# Patient Record
Sex: FEMALE | Race: BLACK OR AFRICAN AMERICAN | Employment: FULL TIME | URBAN - METROPOLITAN AREA
[De-identification: names, ages, dates, MRNs, and addresses within clinical notes are randomized per-mention and may not be internally consistent; named-entity substitution may affect disease eponyms.]

---

## 2017-01-24 ENCOUNTER — HOSPITAL ENCOUNTER (EMERGENCY)
Age: 27
Discharge: HOME | End: 2017-01-24
Attending: EMERGENCY MEDICINE
Payer: MEDICAID

## 2017-01-24 VITALS
TEMPERATURE: 98.9 F | HEART RATE: 78 BPM | HEIGHT: 64 IN | OXYGEN SATURATION: 99 % | BODY MASS INDEX: 34.83 KG/M2 | RESPIRATION RATE: 18 BRPM | DIASTOLIC BLOOD PRESSURE: 80 MMHG | WEIGHT: 204 LBS | SYSTOLIC BLOOD PRESSURE: 131 MMHG

## 2017-01-24 DIAGNOSIS — H65.192 OTHER ACUTE NONSUPPURATIVE OTITIS MEDIA OF LEFT EAR: Primary | ICD-10-CM

## 2017-01-24 PROCEDURE — 77030036550 HC SLNG ARM PCH S2SG -A

## 2017-01-24 PROCEDURE — 99283 EMERGENCY DEPT VISIT LOW MDM: CPT

## 2017-01-24 PROCEDURE — 74011250637 HC RX REV CODE- 250/637: Performed by: EMERGENCY MEDICINE

## 2017-01-24 RX ORDER — HYDROCODONE BITARTRATE AND ACETAMINOPHEN 5; 325 MG/1; MG/1
1 TABLET ORAL
Qty: 20 TAB | Refills: 0 | Status: SHIPPED | OUTPATIENT
Start: 2017-01-24 | End: 2017-01-30 | Stop reason: ALTCHOICE

## 2017-01-24 RX ORDER — AZITHROMYCIN 250 MG/1
TABLET, FILM COATED ORAL
Qty: 6 TAB | Refills: 0 | Status: SHIPPED | OUTPATIENT
Start: 2017-01-24 | End: 2017-01-29

## 2017-01-24 RX ORDER — HYDROCODONE BITARTRATE AND ACETAMINOPHEN 5; 325 MG/1; MG/1
1 TABLET ORAL
Status: COMPLETED | OUTPATIENT
Start: 2017-01-24 | End: 2017-01-24

## 2017-01-24 RX ADMIN — HYDROCODONE BITARTRATE AND ACETAMINOPHEN 1 TABLET: 5; 325 TABLET ORAL at 22:06
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
## 2017-01-25 NOTE — ED NOTES
Patient (s) was given copy of dc instructions and one paper script(s) and one electronic scripts. Patient (s) has verbalized understanding of instructions and script (s). Patient given a current medication reconciliation form and verbalized understanding of their medications. Patient (s) has verbalized understanding of the importance of discussing medications with  his or her physician or clinic they will be following up with. Patient alert and oriented and in no acute distress. Patient discharged home ambulatory with family.
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
## 2017-01-30 ENCOUNTER — HOSPITAL ENCOUNTER (EMERGENCY)
Age: 27
Discharge: HOME | End: 2017-01-30
Attending: INTERNAL MEDICINE
Payer: MEDICAID

## 2017-01-30 VITALS
WEIGHT: 205.2 LBS | TEMPERATURE: 98.4 F | HEIGHT: 64 IN | SYSTOLIC BLOOD PRESSURE: 150 MMHG | RESPIRATION RATE: 20 BRPM | HEART RATE: 89 BPM | DIASTOLIC BLOOD PRESSURE: 76 MMHG | OXYGEN SATURATION: 98 % | BODY MASS INDEX: 35.03 KG/M2

## 2017-01-30 DIAGNOSIS — H60.332 ACUTE SWIMMER'S EAR OF LEFT SIDE: Primary | ICD-10-CM

## 2017-01-30 PROCEDURE — 99283 EMERGENCY DEPT VISIT LOW MDM: CPT

## 2017-01-30 RX ORDER — OFLOXACIN 3 MG/ML
5 SOLUTION AURICULAR (OTIC) 2 TIMES DAILY
Qty: 5 ML | Refills: 0 | Status: SHIPPED | OUTPATIENT
Start: 2017-01-30 | End: 2017-02-06

## 2017-01-30 RX ORDER — CLINDAMYCIN HYDROCHLORIDE 150 MG/1
CAPSULE ORAL
Qty: 80 CAP | Refills: 0 | Status: SHIPPED | OUTPATIENT
Start: 2017-01-30 | End: 2017-03-26

## 2017-01-30 RX ORDER — TRAMADOL HYDROCHLORIDE 50 MG/1
TABLET ORAL
Qty: 24 TAB | Refills: 0 | Status: SHIPPED | OUTPATIENT
Start: 2017-01-30 | End: 2017-03-26

## 2017-01-30 RX ORDER — AMOXICILLIN 500 MG/1
500 CAPSULE ORAL 3 TIMES DAILY
COMMUNITY
End: 2017-01-30

## 2017-01-31 NOTE — DISCHARGE INSTRUCTIONS

## 2017-01-31 NOTE — ED PROVIDER NOTES
Patient is a 27 y.o. female presenting with ear pain. The history is provided by the patient. Ear Pain    The current episode started more than 2 days ago. The problem has not changed (Pt currently taking zithromax and cortisporin drops for severe L ear pain not helping) since onset. There has been no fever. Associated symptoms include ear discharge. Pertinent negatives include no vomiting. Past Medical History:   Diagnosis Date    Asthma     Asthma     COPD     Other ill-defined conditions(799.89)      gallstones       Past Surgical History:   Procedure Laterality Date    Delivery       Hx gyn      Hx cholecystectomy  14     lap juan m - Dr. Liana Fernandez Hx  section           History reviewed. No pertinent family history. Social History     Social History    Marital status: SINGLE     Spouse name: N/A    Number of children: N/A    Years of education: N/A     Occupational History    Not on file. Social History Main Topics    Smoking status: Current Every Day Smoker     Packs/day: 0.25     Years: 14.00    Smokeless tobacco: Never Used    Alcohol use No      Comment: Socially    Drug use: No    Sexual activity: Yes     Partners: Male     Birth control/ protection: Condom     Other Topics Concern    Not on file     Social History Narrative         ALLERGIES: Review of patient's allergies indicates no known allergies. Review of Systems   HENT: Positive for ear discharge and ear pain. Cardiovascular: Negative for chest pain. Gastrointestinal: Negative for nausea and vomiting. Vitals:    17 2128   BP: 150/76   Pulse: 89   Resp: 20   Temp: 98.4 °F (36.9 °C)   SpO2: 98%   Weight: 93.1 kg (205 lb 3.2 oz)   Height: 5' 4\" (1.626 m)            Physical Exam   Constitutional: She is oriented to person, place, and time. She appears well-developed and well-nourished. HENT:   Head: Normocephalic and atraumatic.    L canal swollen and very tender on ear movement no mastoid point tenderness   Neck: Normal range of motion. Neck supple. Cardiovascular: Normal rate and regular rhythm. Pulmonary/Chest: Effort normal and breath sounds normal.   Abdominal: Soft. Bowel sounds are normal.   Musculoskeletal: Normal range of motion. Neurological: She is alert and oriented to person, place, and time. Skin: Skin is warm and dry. Psychiatric: She has a normal mood and affect.         MDM  Number of Diagnoses or Management Options     Amount and/or Complexity of Data Reviewed  Review and summarize past medical records: yes      ED Course       Procedures

## 2017-01-31 NOTE — ED NOTES
Seen for complaints of pain L ear that started 1 week ago. States she was seen to day at [facility] AND [facility] for same complaint. States she took over 12 tabs of ibuporfen (800mg tabs) within a 12 hour period. States she told staff at [facility] and that's why she was kept there majority of day. Complains of increased pain post discharge after putting ear drops into L ear. Denies drainage, denies fever. Emergency Department Nursing Plan of Care       The Nursing Plan of Care is developed from the Nursing assessment and Emergency Department Attending provider initial evaluation. The plan of care may be reviewed in the ED Provider note.     The Plan of Care was developed with the following considerations:   Patient / Family readiness to learn indicated by:verbalized understanding  Persons(s) to be included in education: patient  Barriers to Learning/Limitations:No    Signed     Humberto Channel    1/30/2017   9:40 PM

## 2017-03-26 ENCOUNTER — HOSPITAL ENCOUNTER (EMERGENCY)
Age: 27
Discharge: HOME | End: 2017-03-26
Attending: EMERGENCY MEDICINE | Admitting: EMERGENCY MEDICINE
Payer: MEDICAID

## 2017-03-26 VITALS
SYSTOLIC BLOOD PRESSURE: 129 MMHG | OXYGEN SATURATION: 99 % | HEART RATE: 92 BPM | BODY MASS INDEX: 37.73 KG/M2 | DIASTOLIC BLOOD PRESSURE: 99 MMHG | HEIGHT: 62 IN | TEMPERATURE: 98.8 F | RESPIRATION RATE: 20 BRPM | WEIGHT: 205 LBS

## 2017-03-26 DIAGNOSIS — M54.2 NECK PAIN: Primary | ICD-10-CM

## 2017-03-26 PROCEDURE — 74011250637 HC RX REV CODE- 250/637: Performed by: EMERGENCY MEDICINE

## 2017-03-26 PROCEDURE — 99283 EMERGENCY DEPT VISIT LOW MDM: CPT

## 2017-03-26 RX ORDER — OXYCODONE HYDROCHLORIDE 5 MG/1
5 TABLET ORAL
COMMUNITY
End: 2018-04-18

## 2017-03-26 RX ORDER — NAPROXEN 250 MG/1
500 TABLET ORAL
Status: COMPLETED | OUTPATIENT
Start: 2017-03-26 | End: 2017-03-26

## 2017-03-26 RX ORDER — HYDROCODONE BITARTRATE AND ACETAMINOPHEN 10; 325 MG/1; MG/1
1 TABLET ORAL
COMMUNITY
End: 2017-07-03

## 2017-03-26 RX ORDER — HYDROCODONE BITARTRATE AND ACETAMINOPHEN 5; 325 MG/1; MG/1
1 TABLET ORAL
Qty: 7 TAB | Refills: 0 | Status: SHIPPED | OUTPATIENT
Start: 2017-03-26 | End: 2017-07-03

## 2017-03-26 RX ORDER — NAPROXEN 500 MG/1
500 TABLET ORAL
Qty: 20 TAB | Refills: 0 | Status: SHIPPED | OUTPATIENT
Start: 2017-03-26 | End: 2017-07-03

## 2017-03-26 RX ORDER — TRAMADOL HYDROCHLORIDE 50 MG/1
50 TABLET ORAL
Status: COMPLETED | OUTPATIENT
Start: 2017-03-26 | End: 2017-03-26

## 2017-03-26 RX ORDER — IBUPROFEN 800 MG/1
800 TABLET ORAL
COMMUNITY
End: 2019-01-02

## 2017-03-26 RX ADMIN — TRAMADOL HYDROCHLORIDE 50 MG: 50 TABLET, FILM COATED ORAL at 04:37

## 2017-03-26 RX ADMIN — NAPROXEN 500 MG: 250 TABLET ORAL at 04:36

## 2017-03-26 NOTE — ED NOTES
Pt presents ambulatory to ED complaining of chronic pain to the left side of her face. Pt reports she has an appt with a neurologist. Pt reports she is out of pain medications and states \"I couldn't sleep the pain was so bad. \" Pt is alert and oriented x 4, RR even and unlabored, skin is warm and dry. Assesment completed and pt updated on plan of care.

## 2017-03-26 NOTE — ED TRIAGE NOTES
Emergency Department Nursing Plan of Care       The Nursing Plan of Care is developed from the Nursing assessment and Emergency Department Attending provider initial evaluation. The plan of care may be reviewed in the ED Provider note.     The Plan of Care was developed with the following considerations:   Patient / Family readiness to learn indicated by:verbalized understanding  Persons(s) to be included in education: patient  Barriers to Learning/Limitations:No    Signed     Therese Mars RN    3/26/2017   4:19 AM

## 2017-03-26 NOTE — ED NOTES
Pt reports she will see her neurologist on 3/28/17. Discharge instructions were given to the patient by Magda Michel RN. The patient left the Emergency Department ambulatory, alert and oriented and in no acute distress with 2 prescriptions. The patient was encouraged to call or return to the ED for worsening issues or problems and was encouraged to schedule a follow up appointment for continuing care. The patient verbalized understanding of discharge instructions and prescriptions, all questions were answered. The patient has no further concerns at this time.

## 2017-03-26 NOTE — DISCHARGE INSTRUCTIONS
Neck Pain: Care Instructions  Your Care Instructions  You can have neck pain anywhere from the bottom of your head to the top of your shoulders. It can spread to the upper back or arms. Injuries, painting a ceiling, sleeping with your neck twisted, staying in one position for too long, and many other activities can cause neck pain. Most neck pain gets better with home care. Your doctor may recommend medicine to relieve pain or relax your muscles. He or she may suggest exercise and physical therapy to increase flexibility and relieve stress. You may need to wear a special (cervical) collar to support your neck for a day or two. Follow-up care is a key part of your treatment and safety. Be sure to make and go to all appointments, and call your doctor if you are having problems. It's also a good idea to know your test results and keep a list of the medicines you take. How can you care for yourself at home? · Try using a heating pad on a low or medium setting for 15 to 20 minutes every 2 or 3 hours. Try a warm shower in place of one session with the heating pad. · You can also try an ice pack for 10 to 15 minutes every 2 to 3 hours. Put a thin cloth between the ice and your skin. · Take pain medicines exactly as directed. ¨ If the doctor gave you a prescription medicine for pain, take it as prescribed. ¨ If you are not taking a prescription pain medicine, ask your doctor if you can take an over-the-counter medicine. · If your doctor recommends a cervical collar, wear it exactly as directed. When should you call for help? Call your doctor now or seek immediate medical care if:  · You have new or worsening numbness in your arms, buttocks or legs. · You have new or worsening weakness in your arms or legs. (This could make it hard to stand up.)  · You lose control of your bladder or bowels.   Watch closely for changes in your health, and be sure to contact your doctor if:  · Your neck pain is getting worse.  · You are not getting better after 1 week. · You do not get better as expected. Where can you learn more? Go to http://vincent-evelyn.info/. Enter [id number] in the search box to learn more about \"Neck Pain: Care Instructions. \"  Current as of: May 23, 2016  Content Version: 11.1  © 0535-1370 [employer]. Care instructions adapted under license by [employer] (which disclaims liability or warranty for this information). If you have questions about a medical condition or this instruction, always ask your healthcare professional. [place] [address] any warranty or liability for your use of this information.

## 2017-03-26 NOTE — ED PROVIDER NOTES
HPI Comments: Yelitza Garces, 27 y.o. Female with PMHx of asthma and COPD presents ambulatory to [facility] ED with cc of chronic neck, head and upper back pain x 2 months. Pt also reports sinus pressure. Pain is exacerbated with side to side movement. She took Prednisone and ibuprofen at 3 AM without relief. She had an ear infection that spread to her salivary galnds in January that was treated successfully with antibiotics. She later followed up with an ENT specialist and was placed on Prednisone and an antibiotic. The specialist initially thought it was salivary gland stones but did not find any and referred her to a neurologist. She has an appointment with the neurologist in 2 days. She denies chance of pregnancy. She denies a hx of DM, seizures, and HTN. She denies any fevers, chills, nausea, vomiting, diarrhea, CP or SOB. Social history significant for: + Tobacco, + EtOH, - Illicit Drug Use  PSHx: , cholecystectomy    There are no other complaints, changes, or physical findings at this time. Written by ALVIN Castillo, as dictated by Alexus Webster MD.      The history is provided by the patient. No  was used. Past Medical History:   Diagnosis Date    Asthma     Asthma     COPD     Other ill-defined conditions(799.89)     gallstones       Past Surgical History:   Procedure Laterality Date    DELIVERY       HX  SECTION      HX CHOLECYSTECTOMY  14    ananya Constantino HX GYN           History reviewed. No pertinent family history. Social History     Social History    Marital status: SINGLE     Spouse name: N/A    Number of children: N/A    Years of education: N/A     Occupational History    Not on file.      Social History Main Topics    Smoking status: Current Every Day Smoker     Packs/day: 0.25     Years: 14.00    Smokeless tobacco: Never Used    Alcohol use No      Comment: Socially    Drug use: No    Sexual activity: Yes     Partners: Male     Birth control/ protection: Condom     Other Topics Concern    Not on file     Social History Narrative         ALLERGIES: Review of patient's allergies indicates no known allergies. Review of Systems   Constitutional: Negative for appetite change, chills, diaphoresis, fatigue and fever. HENT: Positive for sinus pressure. Negative for sore throat and trouble swallowing. Respiratory: Negative for cough and shortness of breath. Cardiovascular: Negative for chest pain. Gastrointestinal: Negative for abdominal pain, diarrhea, nausea and vomiting. Genitourinary: Negative for difficulty urinating, dysuria and frequency. Musculoskeletal: Positive for back pain and neck pain. Negative for arthralgias and myalgias. Skin: Negative for color change and rash. Neurological: Positive for headaches. Negative for weakness and numbness. Hematological: Does not bruise/bleed easily. All other systems reviewed and are negative. Vitals:    03/26/17 0416   BP: (!) 129/99   Pulse: 92   Resp: 20   Temp: 98.8 °F (37.1 °C)   SpO2: 99%   Weight: 93 kg (205 lb)   Height: 5' 2\" (1.575 m)            Physical Exam   Constitutional: She is oriented to person, place, and time. She appears well-developed and well-nourished. No distress. HENT:   Head: Normocephalic and atraumatic. Mouth/Throat: Oropharynx is clear and moist. No oropharyngeal exudate or posterior oropharyngeal erythema. Neck: Normal range of motion and full passive range of motion without pain. Neck supple. Neck pain produced by extension of neck with force. Pain with movement of head. No focal findings    Cardiovascular: Normal rate, regular rhythm, normal heart sounds, intact distal pulses and normal pulses. Exam reveals no gallop and no friction rub. No murmur heard. Pulmonary/Chest: Effort normal and breath sounds normal. No accessory muscle usage. No respiratory distress.  She has no decreased breath sounds. She has no wheezes. She has no rhonchi. She has no rales. Abdominal: Soft. Bowel sounds are normal. She exhibits no distension. There is no tenderness. There is no rebound, no guarding and no CVA tenderness. Musculoskeletal: Normal range of motion. She exhibits no edema or tenderness. Thoracic back: She exhibits no tenderness and no bony tenderness. Lumbar back: She exhibits no tenderness and no bony tenderness. Lymphadenopathy:     She has no cervical adenopathy. Neurological: She is alert and oriented to person, place, and time. She has normal strength. She is not disoriented. No cranial nerve deficit or sensory deficit. No focal deficits; 5/5 muscle strength in all extremities   Skin: Skin is warm. No lesion and no rash noted. Rash is not nodular. She is not diaphoretic. Nursing note and vitals reviewed. MDM  Number of Diagnoses or Management Options  Neck pain:   Diagnosis management comments: DDx: Chronic neck pain       Amount and/or Complexity of Data Reviewed  Review and summarize past medical records: yes    Patient Progress  Patient progress: stable    ED Course       Procedures    MEDICATIONS GIVEN:  Medications   naproxen (NAPROSYN) tablet 500 mg (not administered)   traMADol (ULTRAM) tablet 50 mg (not administered)       IMPRESSION:  1. Neck pain        PLAN:  1. Current Discharge Medication List      START taking these medications    Details   naproxen (NAPROSYN) 500 mg tablet Take 1 Tab by mouth every twelve (12) hours as needed for Pain. Qty: 20 Tab, Refills: 0      HYDROcodone-acetaminophen (NORCO) 5-325 mg per tablet Take 1 Tab by mouth every four (4) hours as needed for Pain. Max Daily Amount: 6 Tabs. Qty: 7 Tab, Refills: 0         CONTINUE these medications which have NOT CHANGED    Details   HYDROcodone-acetaminophen (NORCO)  mg tablet Take 1 Tab by mouth.            2.   Follow-up Information     Follow up With Details Comments Contact Info Roxana Paul MD   [address]  [phone]      [facility], [address] [facility] [place] [facility] [address]          Return to ED if worse     Discharge Note:  4:33 AM  The pt is ready for discharge. The pt's signs, symptoms, diagnosis, and discharge instructions have been discussed and pt has conveyed their understanding. The pt is to follow up as recommended or return to ER should their symptoms worsen. Plan has been discussed and pt is in agreement. This note is prepared by Briseyda Mcgowan, acting as a Scribe for Javier Jones MD.    Javier Jones MD: The scribe's documentation has been prepared under my direction and personally reviewed by me in its entirety. I confirm that the notes above accurately reflects all work, treatment, procedures, and medical decision making performed by me.

## 2017-07-03 ENCOUNTER — APPOINTMENT (OUTPATIENT)
Dept: GENERAL RADIOLOGY | Age: 27
End: 2017-07-03
Attending: EMERGENCY MEDICINE
Payer: MEDICAID

## 2017-07-03 ENCOUNTER — HOSPITAL ENCOUNTER (EMERGENCY)
Age: 27
Discharge: HOME | End: 2017-07-03
Attending: EMERGENCY MEDICINE
Payer: MEDICAID

## 2017-07-03 VITALS
SYSTOLIC BLOOD PRESSURE: 138 MMHG | HEART RATE: 86 BPM | DIASTOLIC BLOOD PRESSURE: 73 MMHG | OXYGEN SATURATION: 100 % | TEMPERATURE: 98.3 F | RESPIRATION RATE: 17 BRPM | BODY MASS INDEX: 35.58 KG/M2 | WEIGHT: 208.4 LBS | HEIGHT: 64 IN

## 2017-07-03 DIAGNOSIS — R10.84 ABDOMINAL PAIN, GENERALIZED: Primary | ICD-10-CM

## 2017-07-03 DIAGNOSIS — K59.00 CONSTIPATION, UNSPECIFIED CONSTIPATION TYPE: ICD-10-CM

## 2017-07-03 LAB
ALBUMIN SERPL BCP-MCNC: 3.4 G/DL (ref 3.5–5)
ALBUMIN/GLOB SERPL: 1 {RATIO} (ref 1.1–2.2)
ALP SERPL-CCNC: 82 U/L (ref 45–117)
ALT SERPL-CCNC: 33 U/L (ref 12–78)
ANION GAP BLD CALC-SCNC: 10 MMOL/L (ref 5–15)
APPEARANCE UR: CLEAR
AST SERPL W P-5'-P-CCNC: 23 U/L (ref 15–37)
BACTERIA URNS QL MICRO: NEGATIVE /HPF
BASOPHILS # BLD AUTO: 0.2 K/UL
BASOPHILS # BLD: 2 %
BILIRUB SERPL-MCNC: 0.2 MG/DL (ref 0.2–1)
BILIRUB UR QL: NEGATIVE
BUN SERPL-MCNC: 6 MG/DL (ref 6–20)
BUN/CREAT SERPL: 7 (ref 12–20)
CALCIUM SERPL-MCNC: 8.5 MG/DL (ref 8.5–10.1)
CHLORIDE SERPL-SCNC: 104 MMOL/L (ref 97–108)
CO2 SERPL-SCNC: 23 MMOL/L (ref 21–32)
COLOR UR: NORMAL
CREAT SERPL-MCNC: 0.82 MG/DL (ref 0.55–1.02)
DIFFERENTIAL METHOD BLD: ABNORMAL
EOSINOPHIL # BLD: 0 K/UL
EOSINOPHIL NFR BLD: 0 %
EPITH CASTS URNS QL MICRO: NORMAL /LPF
ERYTHROCYTE [DISTWIDTH] IN BLOOD BY AUTOMATED COUNT: 12.9 % (ref 11.5–14.5)
GLOBULIN SER CALC-MCNC: 3.3 G/DL (ref 2–4)
GLUCOSE SERPL-MCNC: 101 MG/DL (ref 65–100)
GLUCOSE UR STRIP.AUTO-MCNC: NEGATIVE MG/DL
HCG UR QL: NEGATIVE
HCT VFR BLD AUTO: 41.2 % (ref 35–47)
HGB BLD-MCNC: 13.8 G/DL (ref 11.5–16)
HGB UR QL STRIP: NEGATIVE
KETONES UR QL STRIP.AUTO: NEGATIVE MG/DL
LEUKOCYTE ESTERASE UR QL STRIP.AUTO: NEGATIVE
LYMPHOCYTES # BLD AUTO: 43 %
LYMPHOCYTES # BLD: 4.8 K/UL
MCH RBC QN AUTO: 29.4 PG (ref 26–34)
MCHC RBC AUTO-ENTMCNC: 33.5 G/DL (ref 30–36.5)
MCV RBC AUTO: 87.8 FL (ref 80–99)
MONOCYTES # BLD: 0.6 K/UL
MONOCYTES NFR BLD AUTO: 5 %
NEUTS SEG # BLD: 5.6 K/UL
NEUTS SEG NFR BLD AUTO: 50 %
NITRITE UR QL STRIP.AUTO: NEGATIVE
PH UR STRIP: 5.5 [PH] (ref 5–8)
PLATELET # BLD AUTO: 322 K/UL (ref 150–400)
POTASSIUM SERPL-SCNC: 3.9 MMOL/L (ref 3.5–5.1)
PROT SERPL-MCNC: 6.7 G/DL (ref 6.4–8.2)
PROT UR STRIP-MCNC: NEGATIVE MG/DL
RBC # BLD AUTO: 4.69 M/UL (ref 3.8–5.2)
RBC #/AREA URNS HPF: NORMAL /HPF (ref 0–5)
RBC MORPH BLD: ABNORMAL
SODIUM SERPL-SCNC: 137 MMOL/L (ref 136–145)
SP GR UR REFRACTOMETRY: 1.01 (ref 1–1.03)
UA: UC IF INDICATED,UAUC: NORMAL
UROBILINOGEN UR QL STRIP.AUTO: 0.2 EU/DL (ref 0.2–1)
WBC # BLD AUTO: 11.2 K/UL (ref 3.6–11)
WBC URNS QL MICRO: NORMAL /HPF (ref 0–4)

## 2017-07-03 PROCEDURE — 74011250637 HC RX REV CODE- 250/637: Performed by: EMERGENCY MEDICINE

## 2017-07-03 PROCEDURE — 85025 COMPLETE CBC W/AUTO DIFF WBC: CPT | Performed by: EMERGENCY MEDICINE

## 2017-07-03 PROCEDURE — 99283 EMERGENCY DEPT VISIT LOW MDM: CPT

## 2017-07-03 PROCEDURE — 80053 COMPREHEN METABOLIC PANEL: CPT | Performed by: EMERGENCY MEDICINE

## 2017-07-03 PROCEDURE — 81001 URINALYSIS AUTO W/SCOPE: CPT | Performed by: EMERGENCY MEDICINE

## 2017-07-03 PROCEDURE — 74020 XR ABD FLAT/ ERECT: CPT

## 2017-07-03 PROCEDURE — 81025 URINE PREGNANCY TEST: CPT

## 2017-07-03 RX ORDER — DICYCLOMINE HYDROCHLORIDE 20 MG/1
20 TABLET ORAL
Status: DISCONTINUED | OUTPATIENT
Start: 2017-07-03 | End: 2017-07-03

## 2017-07-03 RX ORDER — GABAPENTIN 300 MG/1
300 CAPSULE ORAL 3 TIMES DAILY
COMMUNITY
End: 2019-10-30

## 2017-07-03 RX ORDER — DICYCLOMINE HYDROCHLORIDE 20 MG/1
20 TABLET ORAL
Status: DISCONTINUED | OUTPATIENT
Start: 2017-07-03 | End: 2017-07-03 | Stop reason: CLARIF

## 2017-07-03 RX ORDER — DICYCLOMINE HYDROCHLORIDE 20 MG/1
20 TABLET ORAL
Qty: 20 TAB | Refills: 0 | Status: SHIPPED | OUTPATIENT
Start: 2017-07-03 | End: 2018-04-18

## 2017-07-03 RX ORDER — TRAMADOL HYDROCHLORIDE 50 MG/1
50 TABLET ORAL
COMMUNITY
End: 2018-04-18

## 2017-07-03 RX ORDER — DICYCLOMINE HYDROCHLORIDE 10 MG/ML
20 INJECTION INTRAMUSCULAR
Status: DISCONTINUED | OUTPATIENT
Start: 2017-07-03 | End: 2017-07-03

## 2017-07-03 RX ORDER — DOCUSATE SODIUM 100 MG/1
100 CAPSULE, LIQUID FILLED ORAL 2 TIMES DAILY
Qty: 60 CAP | Refills: 2 | Status: SHIPPED | OUTPATIENT
Start: 2017-07-03 | End: 2017-10-01

## 2017-07-03 RX ORDER — DICYCLOMINE HYDROCHLORIDE 10 MG/1
20 CAPSULE ORAL
Status: COMPLETED | OUTPATIENT
Start: 2017-07-03 | End: 2017-07-03

## 2017-07-03 RX ORDER — MAGNESIUM CITRATE
SOLUTION, ORAL ORAL
Qty: 1 BOTTLE | Refills: 0 | Status: SHIPPED | OUTPATIENT
Start: 2017-07-03 | End: 2019-01-02

## 2017-07-03 RX ADMIN — DICYCLOMINE HYDROCHLORIDE 20 MG: 10 CAPSULE ORAL at 09:34

## 2017-07-03 NOTE — ED NOTES
Patient (s)  given copy of dc instructions and 0 paper script(s) and 3 electronic scripts. Patient (s)  verbalized understanding of instructions and script (s). Patient given a current medication reconciliation form and verbalized understanding of their medications. Patient (s) verbalized understanding of the importance of discussing medications with his or her physician or clinic they will be following up with. Patient alert and oriented and in no acute distress. Patient offered wheelchair from treatment area to hospital entrance, patient denied wheelchair.

## 2017-07-03 NOTE — DISCHARGE INSTRUCTIONS
Abdominal Pain: Care Instructions  Your Care Instructions    Abdominal pain has many possible causes. Some aren't serious and get better on their own in a few days. Others need more testing and treatment. If your pain continues or gets worse, you need to be rechecked and may need more tests to find out what is wrong. You may need surgery to correct the problem. Don't ignore new symptoms, such as fever, nausea and vomiting, urination problems, pain that gets worse, and dizziness. These may be signs of a more serious problem. Your doctor may have recommended a follow-up visit in the next 8 to 12 hours. If you are not getting better, you may need more tests or treatment. The doctor has checked you carefully, but problems can develop later. If you notice any problems or new symptoms, get medical treatment right away. Follow-up care is a key part of your treatment and safety. Be sure to make and go to all appointments, and call your doctor if you are having problems. It's also a good idea to know your test results and keep a list of the medicines you take. How can you care for yourself at home? · Rest until you feel better. · To prevent dehydration, drink plenty of fluids, enough so that your urine is light yellow or clear like water. Choose water and other caffeine-free clear liquids until you feel better. If you have kidney, heart, or liver disease and have to limit fluids, talk with your doctor before you increase the amount of fluids you drink. · If your stomach is upset, eat mild foods, such as rice, dry toast or crackers, bananas, and applesauce. Try eating several small meals instead of two or three large ones. · Wait until 48 hours after all symptoms have gone away before you have spicy foods, alcohol, and drinks that contain caffeine. · Do not eat foods that are high in fat. · Avoid anti-inflammatory medicines such as aspirin, ibuprofen (Advil, Motrin), and naproxen (Aleve).  These can cause stomach upset. Talk to your doctor if you take daily aspirin for another health problem. When should you call for help? Call 911 anytime you think you may need emergency care. For example, call if:  · You passed out (lost consciousness). · You pass maroon or very bloody stools. · You vomit blood or what looks like coffee grounds. · You have new, severe belly pain. Call your doctor now or seek immediate medical care if:  · Your pain gets worse, especially if it becomes focused in one area of your belly. · You have a new or higher fever. · Your stools are black and look like tar, or they have streaks of blood. · You have unexpected vaginal bleeding. · You have symptoms of a urinary tract infection. These may include:  ¨ Pain when you urinate. ¨ Urinating more often than usual.  ¨ Blood in your urine. · You are dizzy or lightheaded, or you feel like you may faint. Watch closely for changes in your health, and be sure to contact your doctor if:  · You are not getting better after 1 day (24 hours). Where can you learn more? Go to http://vincentChipInevelyn.info/. Enter C667 in the search box to learn more about \"Abdominal Pain: Care Instructions. \"  Current as of: March 20, 2017  Content Version: 11.3  © 8850-9234 [employer]. Care instructions adapted under license by [employer] (which disclaims liability or warranty for this information). If you have questions about a medical condition or this instruction, always ask your healthcare professional. [place] [address] any warranty or liability for your use of this information. Constipation: Care Instructions  Your Care Instructions  Constipation means that you have a hard time passing stools (bowel movements). People pass stools from 3 times a day to once every 3 days. What is normal for you may be different. Constipation may occur with pain in the rectum and cramping.  The pain may get worse when you try to pass stools. Sometimes there are small amounts of bright red blood on toilet paper or the surface of stools. This is because of enlarged veins near the rectum (hemorrhoids). A few changes in your diet and lifestyle may help you avoid ongoing constipation. Your doctor may also prescribe medicine to help loosen your stool. Some medicines can cause constipation. These include pain medicines and antidepressants. Tell your doctor about all the medicines you take. Your doctor may want to make a medicine change to ease your symptoms. Follow-up care is a key part of your treatment and safety. Be sure to make and go to all appointments, and call your doctor if you are having problems. It's also a good idea to know your test results and keep a list of the medicines you take. How can you care for yourself at home? · Drink plenty of fluids, enough so that your urine is light yellow or clear like water. If you have kidney, heart, or liver disease and have to limit fluids, talk with your doctor before you increase the amount of fluids you drink. · Include high-fiber foods in your diet each day. These include fruits, vegetables, beans, and whole grains. · Get at least 30 minutes of exercise on most days of the week. Walking is a good choice. You also may want to do other activities, such as running, swimming, cycling, or playing tennis or team sports. · Take a fiber supplement, such as Citrucel or Metamucil, every day. Read and follow all instructions on the label. · Schedule time each day for a bowel movement. A daily routine may help. Take your time having your bowel movement. · Support your feet with a small step stool when you sit on the toilet. This helps flex your hips and places your pelvis in a squatting position. · Your doctor may recommend an over-the-counter laxative to relieve your constipation. Examples are Milk of Magnesia and MiraLax. Read and follow all instructions on the label.  Do not use laxatives on a long-term basis. When should you call for help? Call your doctor now or seek immediate medical care if:  · You have new or worse belly pain. · You have new or worse nausea or vomiting. · You have blood in your stools. Watch closely for changes in your health, and be sure to contact your doctor if:  · Your constipation is getting worse. · You do not get better as expected. Where can you learn more? Go to http://vincent-evelyn.info/. Enter 21 [phone] in the search box to learn more about \"Constipation: Care Instructions. \"  Current as of: March 20, 2017  Content Version: 11.3  © 6028-9063 [employer]. Care instructions adapted under license by [employer] (which disclaims liability or warranty for this information). If you have questions about a medical condition or this instruction, always ask your healthcare professional. [address] any warranty or liability for your use of this information.

## 2017-07-03 NOTE — ED NOTES
Pt reports bloating and abdominal discomfort, intermittent since cholecystectomy. Pt reports she missed her appt with GI doc. Pt reports feeling weak yesterday and having diarrhea. Pt reports she eats once a day and didn't eat all day yesterday, woke up in the middle of the night and ate spaghetti. Pt advised to try eating small meals throughout day and advised to keep a food diary and keep track of symptoms.

## 2017-07-03 NOTE — LETTER
[facility] EMERGENCY DEPT 
[address] [facility] [address] [phone] Work/School Note Date: 7/3/2017 To Whom It May concern: 
 
Kathleen Ty was seen and treated today in the emergency room by the following provider(s): 
Attending Provider: Gregg Acuna MD.   
 
Kathleen Ty may return to work on 07/05/0217.  
 
Sincerely, 
 
 
 
 
Gregg Acuna MD

## 2017-07-03 NOTE — ED PROVIDER NOTES
HPI Comments: Tarun Vera is a 32 y.o. female presenting ambulatory to [facility] c/o right sided abdominal pain since yesterday. She reports additional symptoms of nausea, diarrhea, and generalized weakness. Pt describes her pain as \"sharp\", and reports that she gets the pain ~1 time and hour, and it lasts for seconds at a time. She reports that with her bouts of abdominal pain she will get diarrhea, and after she has the diarrhea she feels generalized weakness. Pt notes that she had diarrhea x5 yesterday. She reports that she has had similar symptoms years ago. Pt notes that she has had a cholecystectomy, and had a GI appointment recently, but missed the appointment. She reports that she was a heavy alcoholic in the past. Pt denies vomiting. PCP: Corrie Mathur MD    There are no other complaints, changes, or physical findings at this time. The history is provided by the patient. No  was used. Past Medical History:   Diagnosis Date    Asthma     Asthma     COPD     Other ill-defined conditions     gallstones       Past Surgical History:   Procedure Laterality Date    DELIVERY       HX  SECTION      HX CHOLECYSTECTOMY  14    lap juan m - Dr. Chaim Dixon HX GYN           History reviewed. No pertinent family history. Social History     Social History    Marital status: SINGLE     Spouse name: N/A    Number of children: N/A    Years of education: N/A     Occupational History    Not on file. Social History Main Topics    Smoking status: Current Every Day Smoker     Packs/day: 0.25     Years: 14.00    Smokeless tobacco: Never Used    Alcohol use No      Comment: Socially    Drug use: No    Sexual activity: Yes     Partners: Female     Birth control/ protection: Condom     Other Topics Concern    Not on file     Social History Narrative         ALLERGIES: Review of patient's allergies indicates no known allergies.     Review of Systems Constitutional: Negative for chills and fever. HENT: Negative for congestion, rhinorrhea, sneezing and sore throat. Eyes: Negative for redness and visual disturbance. Respiratory: Negative for shortness of breath. Cardiovascular: Negative for leg swelling. Gastrointestinal: Positive for abdominal pain, diarrhea and nausea. Negative for vomiting. Genitourinary: Negative for difficulty urinating and frequency. Musculoskeletal: Negative for back pain, myalgias and neck stiffness. Skin: Negative for rash. Neurological: Positive for weakness (generalized). Negative for dizziness, syncope and headaches. Hematological: Negative for adenopathy. All other systems reviewed and are negative. Patient Vitals for the past 12 hrs:   Temp Pulse Resp BP SpO2   07/03/17 0840 98.3 °F (36.8 °C) 86 17 138/73 100 %            Physical Exam   Constitutional: She is oriented to person, place, and time. She appears well-developed and well-nourished. HENT:   Head: Normocephalic and atraumatic. Mouth/Throat: Oropharynx is clear and moist.   Eyes: Conjunctivae and EOM are normal.   Neck: Normal range of motion and full passive range of motion without pain. Neck supple. Cardiovascular: Normal rate, regular rhythm, S1 normal, S2 normal, normal heart sounds, intact distal pulses and normal pulses. No murmur heard. Pulmonary/Chest: Effort normal and breath sounds normal. No respiratory distress. She has no wheezes. Abdominal: Soft. Normal appearance and bowel sounds are normal. She exhibits no distension. There is tenderness. There is no rebound. Mild LUQ tenderness, no RUQ tenderness   Musculoskeletal: Normal range of motion. Neurological: She is alert and oriented to person, place, and time. She has normal strength. Skin: Skin is warm, dry and intact. No rash noted. Psychiatric: She has a normal mood and affect.  Her speech is normal and behavior is normal. Judgment and thought content normal. Nursing note and vitals reviewed. MDM  Number of Diagnoses or Management Options  Diagnosis management comments: DDx: constipation, fecal stasis, pancreatitis        Amount and/or Complexity of Data Reviewed  Clinical lab tests: ordered and reviewed  Tests in the radiology section of CPT®: ordered and reviewed  Review and summarize past medical records: yes    Patient Progress  Patient progress: stable    ED Course       Procedures    10:07 AM  Pt's symptoms are improved. Written by Arun Ryamundo ED Scribe, as dictated by Jd Arevalo MD.    LABORATORY TESTS:  Recent Results (from the past 12 hour(s))   URINALYSIS W/ REFLEX CULTURE    Collection Time: 07/03/17  8:46 AM   Result Value Ref Range    Color YELLOW/STRAW      Appearance CLEAR CLEAR      Specific gravity 1.015 1.003 - 1.030      pH (UA) 5.5 5.0 - 8.0      Protein NEGATIVE  NEG mg/dL    Glucose NEGATIVE  NEG mg/dL    Ketone NEGATIVE  NEG mg/dL    Bilirubin NEGATIVE  NEG      Blood NEGATIVE  NEG      Urobilinogen 0.2 0.2 - 1.0 EU/dL    Nitrites NEGATIVE  NEG      Leukocyte Esterase NEGATIVE  NEG      WBC 0-4 0 - 4 /hpf    RBC 0-5 0 - 5 /hpf    Epithelial cells FEW FEW /lpf    Bacteria NEGATIVE  NEG /hpf    UA:UC IF INDICATED CULTURE NOT INDICATED BY UA RESULT CNI     HCG URINE, QL. - POC    Collection Time: 07/03/17  8:47 AM   Result Value Ref Range    Pregnancy test,urine (POC) NEGATIVE  NEG     CBC WITH AUTOMATED DIFF    Collection Time: 07/03/17  8:56 AM   Result Value Ref Range    WBC 11.2 (H) 3.6 - 11.0 K/uL    RBC 4.69 3.80 - 5.20 M/uL    HGB 13.8 11.5 - 16.0 g/dL    HCT 41.2 35.0 - 47.0 %    MCV 87.8 80.0 - 99.0 FL    MCH 29.4 26.0 - 34.0 PG    MCHC 33.5 30.0 - 36.5 g/dL    RDW 12.9 11.5 - 14.5 %    PLATELET 135 117 - 376 K/uL    NEUTROPHILS 50 %    LYMPHOCYTES 43 %    MONOCYTES 5 %    EOSINOPHILS 0 %    BASOPHILS 2 %    ABS. NEUTROPHILS 5.6 K/UL    ABS. LYMPHOCYTES 4.8 K/UL    ABS. MONOCYTES 0.6 K/UL    ABS.  EOSINOPHILS 0.0 K/UL ABS. BASOPHILS 0.2 K/UL    RBC COMMENTS NORMOCYTIC, NORMOCHROMIC      DF MANUAL     METABOLIC PANEL, COMPREHENSIVE    Collection Time: 07/03/17  8:56 AM   Result Value Ref Range    Sodium 137 136 - 145 mmol/L    Potassium 3.9 3.5 - 5.1 mmol/L    Chloride 104 97 - 108 mmol/L    CO2 23 21 - 32 mmol/L    Anion gap 10 5 - 15 mmol/L    Glucose 101 (H) 65 - 100 mg/dL    BUN 6 6 - 20 MG/DL    Creatinine 0.82 0.55 - 1.02 MG/DL    BUN/Creatinine ratio 7 (L) 12 - 20      GFR est AA >60 >60 ml/min/1.73m2    GFR est non-AA >60 >60 ml/min/1.73m2    Calcium 8.5 8.5 - 10.1 MG/DL    Bilirubin, total 0.2 0.2 - 1.0 MG/DL    ALT (SGPT) 33 12 - 78 U/L    AST (SGOT) 23 15 - 37 U/L    Alk. phosphatase 82 45 - 117 U/L    Protein, total 6.7 6.4 - 8.2 g/dL    Albumin 3.4 (L) 3.5 - 5.0 g/dL    Globulin 3.3 2.0 - 4.0 g/dL    A-G Ratio 1.0 (L) 1.1 - 2.2         IMAGING RESULTS:    EXAM:  XR ABD FLAT/ ERECT     INDICATION:   RUQ pain, diarrhea, fecal stasis?     COMPARISON: Ultrasound 2/19/2014.     FINDINGS: Supine and upright views of the abdomen demonstrate a normal gas  pattern with no significant increased colonic stool burden. There is no free  intraperitoneal air. No soft tissue masses or pathologic calcifications are  seen. The bones and soft tissues are within normal limits. Cholecystectomy  clips are noted.     IMPRESSION  IMPRESSION: No acute findings. MEDICATIONS GIVEN:  Medications   dicyclomine (BENTYL) capsule 20 mg (20 mg Oral Given 7/3/17 0934)       IMPRESSION:  1. Abdominal pain, generalized    2. Constipation, unspecified constipation type        PLAN:  1. Discharge Medication List as of 7/3/2017 10:06 AM      START taking these medications    Details   dicyclomine (BENTYL) 20 mg tablet Take 1 Tab by mouth every six (6) hours as needed (abdominal cramps) for up to 20 doses. , Normal, Disp-20 Tab, R-0      magnesium citrate solution Drink 1/2 of the bottle, wait 1 hour, then drink the 2nd half, Normal, Disp-1 Bottle, R-0      docusate sodium (COLACE) 100 mg capsule Take 1 Cap by mouth two (2) times a day for 90 days. , Normal, Disp-60 Cap, R-2         CONTINUE these medications which have NOT CHANGED    Details   oxyCODONE IR (ROXICODONE) 5 mg immediate release tablet Take 5 mg by mouth every four (4) hours as needed for Pain., Historical Med      ibuprofen (MOTRIN) 800 mg tablet Take 800 mg by mouth., Historical Med      HYDROcodone-acetaminophen (NORCO)  mg tablet Take 1 Tab by mouth., Historical Med      naproxen (NAPROSYN) 500 mg tablet Take 1 Tab by mouth every twelve (12) hours as needed for Pain., Print, Disp-20 Tab, R-0      HYDROcodone-acetaminophen (NORCO) 5-325 mg per tablet Take 1 Tab by mouth every four (4) hours as needed for Pain. Max Daily Amount: 6 Tabs., Print, Disp-7 Tab, R-0           2. Follow-up Information     Follow up With Details Comments Contact Monse Huddleston MD Call in 2 days  [address]  [phone]      the GI doctor you were scheduled to see Call      [facility] EMERGENCY DEPT  As needed, If symptoms worsen [address] [phone]        Return to ED if worse     DISCHARGE NOTE:  10:08 AM  The patient is ready for discharge. The patient's signs, symptoms, diagnosis, and discharge instructions have been discussed and the patient has conveyed their understanding. The patient is to follow up as recommended or return to the ER should their symptoms worsen. Plan has been discussed and the patient is in agreement. This note is prepared by Tato Dick, acting as Scribe for MD Manuel Sandoval MD: The scribe's documentation has been prepared under my direction and personally reviewed by me in its entirety. I confirm that the note above accurately reflects all work, treatment, procedures, and medical decision making performed by me.

## 2017-07-10 ENCOUNTER — HOSPITAL ENCOUNTER (EMERGENCY)
Age: 27
Discharge: HOME | End: 2017-07-10
Attending: EMERGENCY MEDICINE
Payer: MEDICAID

## 2017-07-10 VITALS
WEIGHT: 207 LBS | TEMPERATURE: 98.7 F | OXYGEN SATURATION: 97 % | RESPIRATION RATE: 16 BRPM | BODY MASS INDEX: 35.34 KG/M2 | SYSTOLIC BLOOD PRESSURE: 137 MMHG | DIASTOLIC BLOOD PRESSURE: 90 MMHG | HEART RATE: 108 BPM | HEIGHT: 64 IN

## 2017-07-10 DIAGNOSIS — K04.7 DENTAL INFECTION: Primary | ICD-10-CM

## 2017-07-10 PROCEDURE — 99283 EMERGENCY DEPT VISIT LOW MDM: CPT

## 2017-07-10 PROCEDURE — 74011000250 HC RX REV CODE- 250: Performed by: PHYSICIAN ASSISTANT

## 2017-07-10 PROCEDURE — 74011250637 HC RX REV CODE- 250/637: Performed by: PHYSICIAN ASSISTANT

## 2017-07-10 RX ORDER — CLINDAMYCIN HYDROCHLORIDE 150 MG/1
300 CAPSULE ORAL
Status: COMPLETED | OUTPATIENT
Start: 2017-07-10 | End: 2017-07-10

## 2017-07-10 RX ORDER — CLINDAMYCIN HYDROCHLORIDE 300 MG/1
300 CAPSULE ORAL 4 TIMES DAILY
Qty: 28 CAP | Refills: 0 | Status: SHIPPED | OUTPATIENT
Start: 2017-07-10 | End: 2017-07-17

## 2017-07-10 RX ORDER — CHLORHEXIDINE GLUCONATE 1.2 MG/ML
15 RINSE ORAL 2 TIMES DAILY
Qty: 420 ML | Refills: 0 | Status: SHIPPED | OUTPATIENT
Start: 2017-07-10 | End: 2017-07-24

## 2017-07-10 RX ADMIN — CLINDAMYCIN HYDROCHLORIDE 300 MG: 150 CAPSULE ORAL at 15:19

## 2017-07-10 RX ADMIN — LIDOCAINE HYDROCHLORIDE: 20 SOLUTION ORAL; TOPICAL at 15:19

## 2017-07-10 NOTE — ED TRIAGE NOTES
Pt reports having 2 broken teeth to the right back upper jaw for the past several months. Pt states she took Motrin 800 mg with Neurontin 300 mg this morning. Pt got some relief. Pt took Tylenol 1000 mg @ 1330 ans states she did not get any relief.

## 2017-07-10 NOTE — DISCHARGE INSTRUCTIONS
We hope that we have addressed all of your medical concerns. The examination and treatment you received in the Emergency Department were for an emergent problem and were not intended as complete care. It is important that you follow up with your healthcare provider(s) for ongoing care. If your symptoms worsen or do not improve as expected, and you are unable to reach your usual health care provider(s), you should return to the Emergency Department. Today's healthcare is undergoing tremendous change, and patient satisfaction surveys are one of the many tools to assess the quality of medical care. You may receive a survey from the [facility] regarding your experience in the Emergency Department. I hope that your experience has been completely positive, particularly the medical care that I provided. As such, please participate in the survey; anything less than excellent does not meet my expectations or intentions. [address] [facility] and [address] [place] [address] participate in nationally recognized quality of care measures. If your blood pressure is greater than 120/80, as reported below, we urge that you seek medical care to address the potential of high blood pressure, commonly known as hypertension. Hypertension can be hereditary or can be caused by certain medical conditions, pain, stress, or \"white coat syndrome. \"       Please make an appointment with your health care provider(s) for follow up of your Emergency Department visit. VITALS:   Patient Vitals for the past 8 hrs:   Temp Pulse Resp BP SpO2   07/10/17 1443 98.7 °F (37.1 °C) (!) 108 16 137/90 97 %          Thank you for allowing us to provide you with medical care today. We realize that you have many choices for your emergency care needs. Please choose us in the future for any continued health care needs. Omid Rosenberg, [address] [place] [address] [address] Office: [phone]            No results found for this or any previous visit (from the past 24 hour(s)). No results found. Abscessed Tooth: Care Instructions  Your Care Instructions    An abscessed tooth is a tooth that has a pocket of pus in the tissues around it. Pus forms when the body tries to fight an infection caused by bacteria. If the pus cannot drain, it forms an abscess. An abscessed tooth can cause red, swollen gums and throbbing pain, especially when you chew. You may have a bad taste in your mouth and a fever, and your jaw may swell. Damage to the tooth, untreated tooth decay, or gum disease can cause an abscessed tooth. An abscessed tooth needs to be treated by a dental professional right away. If it is not treated, the infection could spread to other parts of your body. Your dentist will give you antibiotics to stop the infection. He or she may make a hole in the tooth or cut open (roman) the abscess inside your mouth so that the infection can drain, which should relieve your pain. You may need to have a root canal treatment, which tries to save your tooth by taking out the infected pulp and replacing it with a healing medicine and/or a filling. If these treatments do not work, your tooth may have to be removed. Follow-up care is a key part of your treatment and safety. Be sure to make and go to all appointments, and call your doctor if you are having problems. It's also a good idea to know your test results and keep a list of the medicines you take. How can you care for yourself at home? · Reduce pain and swelling in your face and jaw by putting ice or a cold pack on the outside of your cheek for 10 to 20 minutes at a time. Put a thin cloth between the ice and your skin. · Take pain medicines exactly as directed. ¨ If the doctor gave you a prescription medicine for pain, take it as prescribed.   ¨ If you are not taking a prescription pain medicine, ask your doctor if you can take an over-the-counter medicine. · Take your antibiotics as directed. Do not stop taking them just because you feel better. You need to take the full course of antibiotics. To prevent tooth abscess  · Brush and floss every day, and have regular dental checkups. · Eat a healthy diet, and avoid sugary foods and drinks. · Do not smoke, use e-cigarettes with nicotine, or use spit tobacco. Tobacco and nicotine slow your ability to heal. Tobacco also increases your risk for gum disease and cancer of the mouth and throat. If you need help quitting, talk to your doctor about stop-smoking programs and medicines. These can increase your chances of quitting for good. When should you call for help? Call 911 anytime you think you may need emergency care. For example, call if:  · You have trouble breathing. Call your doctor now or seek immediate medical care if:  · You have new or worse symptoms of infection, such as:  ¨ Increased pain, swelling, warmth, or redness. ¨ Red streaks leading from the area. ¨ Pus draining from the area. ¨ A fever. Watch closely for changes in your health, and be sure to contact your doctor if:  · You do not get better as expected. Where can you learn more? Go to http://vincent-evelyn.info/. Enter J928 in the search box to learn more about \"Abscessed Tooth: Care Instructions. \"  Current as of: September 19, 2016  Content Version: 11.3  © 3566-2348 [employer]. Care instructions adapted under license by [employer] (which disclaims liability or warranty for this information). If you have questions about a medical condition or this instruction, always ask your healthcare professional. [place] [address] any warranty or liability for your use of this information.

## 2017-07-10 NOTE — ED PROVIDER NOTES
HPI Comments: 32 y.o. female with past medical history significant for gallstones, asthma, and COPD who presents from home with chief complaint of dental pain. Patient mentions hx of dental issues for the past 10 years. Patient states onset of moderate right upper jaw pain over the right cheek since last night, throbbing and aching, 7/10 in severity. Patient admits she is unsure which tooth is causing her sx at this time, but mentions she has a dental appointment in 8 days. Patient notes she has tried a variety of medications for her sx with little relief including gargling water and salt. Patient notes she took Ibuprofen 800 mg with Gabapentin 300 mg earlier this morning and another dose of these medications 1.5 hours ago. Patient also took Tylenol 1000 mg 1 hour ago with no relief. Patient denies sinus issues, runny nose, difficulty swallowing, fever, chills, CP, SOB, and congestion. There are no other acute medical concerns at this time. Social hx: Tobacco Use: Yes (1/4 pack per day), Alcohol Use: No    PCP: Mariella Resendez MD    Note written by Bala Live, as dictated by Darryle Miners, PA 2:58 PM      The history is provided by the patient. Past Medical History:   Diagnosis Date    Asthma     Asthma     COPD     Other ill-defined conditions     gallstones       Past Surgical History:   Procedure Laterality Date    DELIVERY       HX  SECTION      HX CHOLECYSTECTOMY  14    [facility] - Dr. Hola Anand HX GYN           History reviewed. No pertinent family history. Social History     Social History    Marital status: SINGLE     Spouse name: N/A    Number of children: N/A    Years of education: N/A     Occupational History    Not on file.      Social History Main Topics    Smoking status: Current Every Day Smoker     Packs/day: 0.25     Years: 14.00    Smokeless tobacco: Never Used    Alcohol use No      Comment: Socially    Drug use: No    Sexual activity: Yes     Partners: Female     Birth control/ protection: Condom     Other Topics Concern    Not on file     Social History Narrative         ALLERGIES: Review of patient's allergies indicates no known allergies. Review of Systems   Constitutional: Negative for fever. HENT: Positive for dental problem and facial swelling. Negative for congestion and trouble swallowing. Eyes: Negative for visual disturbance. Respiratory: Negative for shortness of breath. Cardiovascular: Negative for chest pain. Gastrointestinal: Negative for vomiting. Musculoskeletal: Negative for neck pain and neck stiffness. Neurological: Negative for seizures. Psychiatric/Behavioral: Negative for confusion. All other systems reviewed and are negative. Vitals:    07/10/17 1443   BP: 137/90   Pulse: (!) 108   Resp: 16   Temp: 98.7 °F (37.1 °C)   SpO2: 97%   Weight: 93.9 kg (207 lb)   Height: 5' 4\" (1.626 m)            Physical Exam   Constitutional: She is oriented to person, place, and time. She appears well-developed and well-nourished. No distress. Pleasant AA female   HENT:   Head: Normocephalic and atraumatic. Right Ear: External ear normal.   Left Ear: External ear normal.   Mouth/Throat:       Minimal questionable swelling over the right cheek without erythema or warmth  No trismus, stridor, or drooling  Overall somewhat poor dentition  No sublingual or posterior oropharyngeal edema or erythema   Eyes: Conjunctivae are normal. No scleral icterus. Neck: Normal range of motion. Neck supple. No tracheal deviation present. Cardiovascular: Normal rate, regular rhythm and normal heart sounds. Exam reveals no gallop and no friction rub. No murmur heard. No murmur   Pulmonary/Chest: Effort normal and breath sounds normal. No stridor. No respiratory distress. She has no wheezes. Abdominal: Soft. She exhibits no distension. Musculoskeletal: Normal range of motion.    Lymphadenopathy:     She has no cervical adenopathy. Neurological: She is alert and oriented to person, place, and time. Skin: Skin is warm and dry. Psychiatric: She has a normal mood and affect. Her behavior is normal.   Nursing note and vitals reviewed. MDM  Number of Diagnoses or Management Options  Dental infection:   Diagnosis management comments: 32year old female presenting to the ED for dental pain with associated minimal facial swelling. No F/C or other systemic symptoms. No symptoms or findings on exam c/f deep space infection. Discussed with pt use of abx, peridex, OTC meds PRN pain, pt has dental f/u in 8 days. Return precautions given.        Amount and/or Complexity of Data Reviewed  Discuss the patient with other providers: yes (Dr. Nigel Mcdaniel, ED attending)      ED Course       Procedures

## 2018-04-18 ENCOUNTER — HOSPITAL ENCOUNTER (EMERGENCY)
Age: 28
Discharge: HOME | End: 2018-04-18
Attending: EMERGENCY MEDICINE
Payer: MEDICAID

## 2018-04-18 VITALS
HEIGHT: 64 IN | BODY MASS INDEX: 36.5 KG/M2 | TEMPERATURE: 98.1 F | SYSTOLIC BLOOD PRESSURE: 150 MMHG | OXYGEN SATURATION: 99 % | HEART RATE: 96 BPM | WEIGHT: 213.8 LBS | RESPIRATION RATE: 16 BRPM | DIASTOLIC BLOOD PRESSURE: 101 MMHG

## 2018-04-18 DIAGNOSIS — R03.0 ELEVATED BLOOD PRESSURE READING: ICD-10-CM

## 2018-04-18 DIAGNOSIS — F17.200 TOBACCO DEPENDENCE: ICD-10-CM

## 2018-04-18 DIAGNOSIS — R51.9 LEFT FACIAL PRESSURE AND PAIN: Primary | ICD-10-CM

## 2018-04-18 LAB — HCG UR QL: NEGATIVE

## 2018-04-18 PROCEDURE — 81025 URINE PREGNANCY TEST: CPT

## 2018-04-18 PROCEDURE — 99284 EMERGENCY DEPT VISIT MOD MDM: CPT

## 2018-04-18 PROCEDURE — 74011250637 HC RX REV CODE- 250/637: Performed by: PHYSICIAN ASSISTANT

## 2018-04-18 RX ORDER — CLINDAMYCIN HYDROCHLORIDE 150 MG/1
300 CAPSULE ORAL
Status: COMPLETED | OUTPATIENT
Start: 2018-04-18 | End: 2018-04-18

## 2018-04-18 RX ORDER — PREDNISONE 10 MG/1
TABLET ORAL
Qty: 21 TAB | Refills: 0 | Status: SHIPPED | OUTPATIENT
Start: 2018-04-18 | End: 2019-01-02

## 2018-04-18 RX ORDER — CLINDAMYCIN HYDROCHLORIDE 300 MG/1
300 CAPSULE ORAL 3 TIMES DAILY
Qty: 10 CAP | Refills: 0 | Status: SHIPPED | OUTPATIENT
Start: 2018-04-18 | End: 2018-04-28

## 2018-04-18 RX ORDER — ONDANSETRON 4 MG/1
4 TABLET, ORALLY DISINTEGRATING ORAL
Status: COMPLETED | OUTPATIENT
Start: 2018-04-18 | End: 2018-04-18

## 2018-04-18 RX ORDER — OXYCODONE AND ACETAMINOPHEN 5; 325 MG/1; MG/1
1 TABLET ORAL
Qty: 12 TAB | Refills: 0 | Status: SHIPPED | OUTPATIENT
Start: 2018-04-18 | End: 2018-04-30

## 2018-04-18 RX ORDER — OXYCODONE AND ACETAMINOPHEN 5; 325 MG/1; MG/1
2 TABLET ORAL
Status: COMPLETED | OUTPATIENT
Start: 2018-04-18 | End: 2018-04-18

## 2018-04-18 RX ORDER — OXYCODONE HYDROCHLORIDE 5 MG/1
5 TABLET ORAL
Qty: 12 TAB | Refills: 0 | Status: SHIPPED | OUTPATIENT
Start: 2018-04-18 | End: 2018-04-18 | Stop reason: CLARIF

## 2018-04-18 RX ADMIN — OXYCODONE HYDROCHLORIDE AND ACETAMINOPHEN 2 TABLET: 5; 325 TABLET ORAL at 12:50

## 2018-04-18 RX ADMIN — CLINDAMYCIN HYDROCHLORIDE 300 MG: 150 CAPSULE ORAL at 12:50

## 2018-04-18 RX ADMIN — ONDANSETRON 4 MG: 4 TABLET, ORALLY DISINTEGRATING ORAL at 12:49

## 2018-04-18 NOTE — DISCHARGE INSTRUCTIONS
Head or Face Pain: Care Instructions  Your Care Instructions    Common causes of head or face pain are allergies, stress, and injuries. Other causes include tooth problems and sinus infections. Eating certain foods, such as chocolate or cheese, or drinking certain liquids, such as coffee or cola, can cause head pain for some people. If you have mild head pain, you may not need treatment. It is important to watch your symptoms and talk to your doctor if your pain continues or gets worse. Follow-up care is a key part of your treatment and safety. Be sure to make and go to all appointments, and call your doctor if you are having problems. It's also a good idea to know your test results and keep a list of the medicines you take. How can you care for yourself at home? · Take pain medicines exactly as directed. ¨ If the doctor gave you a prescription medicine for pain, take it as prescribed. ¨ If you are not taking a prescription pain medicine, ask your doctor if you can take an over-the-counter pain medicine. · Take it easy for the next few days or longer if you are not feeling well. · Use a warm, moist towel or heating pad set on low to relax tight muscles in your shoulder and neck. Have someone gently massage your neck and shoulders. · Put ice or a cold pack on the area for 10 to 20 minutes at a time. Put a thin cloth between the ice and your skin. When should you call for help? Call 911 anytime you think you may need emergency care. For example, call if:  ? · You have twitching, jerking, or a seizure. ? · You passed out (lost consciousness). ? · You have symptoms of a stroke. These may include:  ¨ Sudden numbness, tingling, weakness, or loss of movement in your face, arm, or leg, especially on only one side of your body. ¨ Sudden vision changes. ¨ Sudden trouble speaking. ¨ Sudden confusion or trouble understanding simple statements. ¨ Sudden problems with walking or balance.   ¨ A sudden, severe headache that is different from past headaches. ? · You have jaw pain and pain in your chest, shoulder, neck, or arm. ?Call your doctor now or seek immediate medical care if:  ? · You have a fever with a stiff neck or a severe headache. ? · You have nausea and vomiting, or you cannot keep food or liquids down. ? Watch closely for changes in your health, and be sure to contact your doctor if:  ? · Your head or face pain does not get better as expected. Where can you learn more? Go to http://vincent-evelyn.info/. Enter P568 in the search box to learn more about \"Head or Face Pain: Care Instructions. \"  Current as of: March 20, 2017  Content Version: 11.4  © 4490-7975 [employer]. Care instructions adapted under license by [employer] (which disclaims liability or warranty for this information). If you have questions about a medical condition or this instruction, always ask your healthcare professional. [place] [address] any warranty or liability for your use of this information. Elevated Blood Pressure: Care Instructions  Your Care Instructions    Blood pressure is a measure of how hard the blood pushes against the walls of your arteries. It's normal for blood pressure to go up and down throughout the day. But if it stays up over time, you have high blood pressure. Two numbers tell you your blood pressure. The first number is the systolic pressure. It shows how hard the blood pushes when your heart is pumping. The second number is the diastolic pressure. It shows how hard the blood pushes between heartbeats, when your heart is relaxed and filling with blood. An ideal blood pressure in adults is less than 120/80 (say \"120 over 80\"). High blood pressure is 140/90 or higher. You have high blood pressure if your top number is 140 or higher or your bottom number is 90 or higher, or both.   The main test for high blood pressure is simple, fast, and painless. To diagnose high blood pressure, your doctor will test your blood pressure at different times. After testing your blood pressure, your doctor may ask you to test it again when you are home. If you are diagnosed with high blood pressure, you can work with your doctor to make a long-term plan to manage it. Follow-up care is a key part of your treatment and safety. Be sure to make and go to all appointments, and call your doctor if you are having problems. It's also a good idea to know your test results and keep a list of the medicines you take. How can you care for yourself at home? · Do not smoke. Smoking increases your risk for heart attack and stroke. If you need help quitting, talk to your doctor about stop-smoking programs and medicines. These can increase your chances of quitting for good. · Stay at a healthy weight. · Try to limit how much sodium you eat to less than 2,300 milligrams (mg) a day. Your doctor may ask you to try to eat less than 1,500 mg a day. · Be physically active. Get at least 30 minutes of exercise on most days of the week. Walking is a good choice. You also may want to do other activities, such as running, swimming, cycling, or playing tennis or team sports. · Avoid or limit alcohol. Talk to your doctor about whether you can drink any alcohol. · Eat plenty of fruits, vegetables, and low-fat dairy products. Eat less saturated and total fats. · Learn how to check your blood pressure at home. When should you call for help? Call your doctor now or seek immediate medical care if:  ? · Your blood pressure is much higher than normal (such as 180/110 or higher). ? · You think high blood pressure is causing symptoms such as:  ¨ Severe headache. ¨ Blurry vision. ? Watch closely for changes in your health, and be sure to contact your doctor if:  ? · You do not get better as expected. Where can you learn more?   Go to http://vincent-evelyn.info/. Enter I775 in the search box to learn more about \"Elevated Blood Pressure: Care Instructions. \"  Current as of: September 21, 2016  Content Version: 11.4  © 4875-6203 [employer]. Care instructions adapted under license by [employer] (which disclaims liability or warranty for this information). If you have questions about a medical condition or this instruction, always ask your healthcare professional. [place] [address] any warranty or liability for your use of this information. Trigeminal Neuralgia: Care Instructions  Your Care Instructions    Trigeminal neuralgia is a problem with the large nerve that brings feeling to your face. It causes a sudden, sharp pain on one side of your face. Just touching your cheek or talking can set off shooting pain toward the ear, eye, or nostril. Living with this pain can be very hard. Some people have long periods when they do not have pain, and then it comes back. Some people have periods of pain often. But medicine or other treatment often can make the pain go away. If you keep having pain, surgery may help. This problem is also called tic douloureux (say \"tik doo-jessie-QUINTON\"). Follow-up care is a key part of your treatment and safety. Be sure to make and go to all appointments, and call your doctor if you are having problems. It's also a good idea to know your test results and keep a list of the medicines you take. How can you care for yourself at home? · Write down when you have pain and what you were doing when it started. Try to find what causes the pain. Being in a cold wind, yawning, or shaving are examples. Avoid or limit these triggers if you can. · Be safe with medicines. Take your medicines exactly as prescribed. ¨ Your doctor may have prescribed medicines used to treat depression and seizures.  They can reduce your pain, help you sleep better, and improve your mood.  ¨ Call your doctor if you think you are having a problem with your medicine. You will get more details on the specific medicines your doctor prescribes. ¨ If you are not taking a prescription pain medicine, take an over-the-counter medicine such as acetaminophen (Tylenol), ibuprofen (Advil, Motrin), or naproxen (Aleve). Read and follow all instructions on the label. ¨ Do not take two or more pain medicines at the same time unless the doctor told you to. Many pain medicines have acetaminophen, which is Tylenol. Too much acetaminophen (Tylenol) can be harmful. · Reduce stress in your life. Ask your doctor about ways to relax. These may include breathing exercises and massage. · Think about joining a support group with other people who have this problem. These groups can give comfort and information about what to do to feel better. Your doctor can tell you how to find a support group. When should you call for help? Call your doctor now or seek immediate medical care if:  ? · You have severe pain that you can't control. ? Watch closely for changes in your health, and be sure to contact your doctor if:  ? · You are not able to sleep because of the pain. ? · You do not get better as expected. Where can you learn more? Go to http://vincent-evelyn.info/. Enter R378 in the search box to learn more about \"Trigeminal Neuralgia: Care Instructions. \"  Current as of: March 20, 2017  Content Version: 11.4  © 6478-1420 [employer]. Care instructions adapted under license by [employer] (which disclaims liability or warranty for this information). If you have questions about a medical condition or this instruction, always ask your healthcare professional. [address] any warranty or liability for your use of this information.

## 2018-04-18 NOTE — Clinical Note
Follow up with primary care for recheck of blood pressure. Follow up with your Ear, Nose, Throat specialist for recheck. Contact information provided for local ENT if needed. Also follow up with your Neurologist at [facility] for recheck. Return to the [address] [place] [address] Dept for any concerns.

## 2018-04-18 NOTE — LETTER
[facility] EMERGENCY DEPT 
[address] [facility] [address] [address] 
[phone] Work/School Note Date: 4/18/2018 To Whom It May concern: 
 
Husam Rapp was seen and treated today in the emergency room. She may return to work in 2 to 3 days, as symptoms improve. Sincerely, Pau Bishop

## 2018-04-18 NOTE — ED NOTES
Pt presents to the ED with c/o left ear pain that radiates to her jaw and neck X1 day. Pt reports taking ibuprofen but this caused her face to feel like it was burning. Pt was given amoxicillin and started taking that last night. Pt is speaking in full sentences. Pt is ambulatory. Pt is alert and oriented. Emergency Department Nursing Plan of Care       The Nursing Plan of Care is developed from the Nursing assessment and Emergency Department Attending provider initial evaluation. The plan of care may be reviewed in the ED Provider note.     The Plan of Care was developed with the following considerations:   Patient / Family readiness to learn indicated by:verbalized understanding  Persons(s) to be included in education: patient  Barriers to Learning/Limitations:No    Signed     Eleazar Oakley    4/18/2018   11:54 AM

## 2018-04-18 NOTE — ED NOTES
Patient was discharged by provider. .    Patient has been instructed that they have been given Percocet* which contains opioids, benzodiazepines, or other sedating drugs. Patient is aware that they  will need to refrain from driving or operating heavy machinery after taking this medication. Patient also instructed that they need to avoid drinking alcohol and using other products containing opioids, benzodiazepines, or other sedating drugs. Patient verbalized understanding.

## 2018-04-18 NOTE — ED PROVIDER NOTES
EMERGENCY DEPARTMENT HISTORY AND PHYSICAL EXAM      Date: 4/18/2018  Patient Name: Keila Horner    History of Presenting Illness     Chief Complaint   Patient presents with    Dental Pain     left side since yesterday, [facility] ibuprofen 800mg and amoxicillin started, worsening pain after meds    Ear Pain     left side       History Provided By: Patient    HPI: Keila Horner, 32 y.o. female with PMHx significant for Asthma, COPD and tobacco dependence presents ambulatory to the ED with cc of L dental and ear pain. Pt states she was out of town when she had abrupt onset L sided facial pain to maxillary region/L ear. Pt denied trauma/injury. \"I assumed it was because of my teeth, because I have some bad teeth on that side\". Pt states she began taking some leftover Amoxicillin but the pain intensified. She went to [facility] this am where she was evaluated and placed on pseudophedrine for \"fluid in my ear\". Pt states the pain has worsened since onset. The 800mg Ibuprofen she has been taking is no longer working. Pt denied fever/chills. No drainage or bleeding from teeth/gums. Pt states she had similar sx \"except it was in the back of my head\" in January of this year and was referred to an Ear, Nose, Throat specialist who later referred her to a Neurologist at [facility]. Pt states she was diagnosed with \"occiput something\" and given Neurontin which she did not tolerate well. Pt denied rash. No difficulty chewing. C/o feeling of tenderness over sinuses as well as sensation of swelling to the L face. PCP: Elis Rangel MD    There are no other complaints, changes, or physical findings at this time. Current Outpatient Prescriptions   Medication Sig Dispense Refill    dicyclomine (BENTYL) 20 mg tablet Take 1 Tab by mouth every six (6) hours as needed (abdominal cramps) for up to 20 doses.  20 Tab 0    magnesium citrate solution Drink 1/2 of the bottle, wait 1 hour, then drink the 2nd half 1 Bottle 0    gabapentin (NEURONTIN) 300 mg capsule Take 300 mg by mouth three (3) times daily.  traMADol (ULTRAM) 50 mg tablet Take 50 mg by mouth every six (6) hours as needed for Pain.  ibuprofen (MOTRIN) 800 mg tablet Take 800 mg by mouth.  oxyCODONE IR (ROXICODONE) 5 mg immediate release tablet Take 5 mg by mouth every four (4) hours as needed for Pain. Past History     Past Medical History:  Past Medical History:   Diagnosis Date    Asthma     Asthma     COPD     Other ill-defined conditions     gallstones       Past Surgical History:  Past Surgical History:   Procedure Laterality Date    DELIVERY       HX  SECTION      HX CHOLECYSTECTOMY  14    ananya Batista [facility] GYN         Family History:  No family history on file. Social History:  Social History   Substance Use Topics    Smoking status: Current Every Day Smoker     Packs/day: 0.25     Years: 14.00    Smokeless tobacco: Never Used    Alcohol use No      Comment: Socially       Allergies:  No Known Allergies      Review of Systems   Review of Systems   Constitutional: Negative for chills and fever. HENT: Positive for dental problem, ear pain and sinus pressure. Negative for congestion, facial swelling, rhinorrhea, sore throat, tinnitus, trouble swallowing and voice change. Eyes: Negative for pain, discharge, redness and itching. Respiratory: Negative for cough and shortness of breath. Cardiovascular: Negative for chest pain and palpitations. Gastrointestinal: Negative for abdominal pain, diarrhea, nausea and vomiting. Endocrine: Negative for polydipsia, polyphagia and polyuria. Genitourinary: Negative for dysuria and hematuria. Musculoskeletal: Negative for neck pain and neck stiffness. Skin: Negative for rash and wound. Allergic/Immunologic: Negative for food allergies and immunocompromised state. Neurological: Negative for dizziness and headaches. Psychiatric/Behavioral: Negative for agitation and confusion. The patient is nervous/anxious. Physical Exam   Physical Exam   Constitutional: She is oriented to person, place, and time. She appears well-developed and well-nourished. WDWN AA female, alert, in marked discomfort, moaning during exam   HENT:   Head: Normocephalic and atraumatic. Right Ear: External ear normal.   Left Ear: External ear normal.   Nose: Nose normal.   Mouth/Throat: No oropharyngeal exudate. Pt with tenderness noted to L frontal region and L maxillary region, no erythema/heat or appreciable facial swelling. Poor overall dental health with multiple dental caries and missing teeth but no appreciable abscess. No dental tenderness. Increased effusion noted to TMs bilat. No erythema. Good light reflex noted. Eyes: Conjunctivae and EOM are normal. Pupils are equal, round, and reactive to light. Right eye exhibits no discharge. Left eye exhibits no discharge. No scleral icterus. Neck: Normal range of motion. Neck supple. No JVD present. No tracheal deviation present. No thyromegaly present. Cardiovascular: Normal rate, regular rhythm and normal heart sounds. Pulmonary/Chest: Effort normal and breath sounds normal. No respiratory distress. She has no wheezes. Abdominal: Soft. There is no tenderness. Musculoskeletal: Normal range of motion. She exhibits no edema. Lymphadenopathy:     She has no cervical adenopathy. Neurological: She is alert and oriented to person, place, and time. She exhibits normal muscle tone. Coordination normal.   Skin: Skin is warm and dry. No rash noted. She is not diaphoretic. No erythema. Psychiatric: She has a normal mood and affect. Her behavior is normal. Judgment normal.   Nursing note and vitals reviewed. Diagnostic Study Results         Medical Decision Making   I am the first provider for this patient.     I reviewed the vital signs, available nursing notes, past medical history, past surgical history, family history and social history. Vital Signs-Reviewed the patient's vital signs. Patient Vitals for the past 12 hrs:   Temp Pulse Resp SpO2   04/18/18 1130 98.1 °F (36.7 °C) 93 16 99 %         Records Reviewed: Old Medical Records    Provider Notes (Medical Decision Making):   Sinusitis, dentalgia, dental caries/abcess, otitis, temporal neuralgia    ED Course:   Initial assessment performed. The patients presenting problems have been discussed, and they are in agreement with the care plan formulated and outlined with them. I have encouraged them to ask questions as they arise throughout their visit. Will d/c home with antibiotics and steroids and have encouraged follow up with her ENT and her Neurologist, as I suspect this is related to her prior symptoms (Neuralgia). PLAN:  1. Current Discharge Medication List      START taking these medications    Details   clindamycin (CLEOCIN) 300 mg capsule Take 1 Cap by mouth three (3) times daily for 10 days. Qty: 10 Cap, Refills: 0      predniSONE (STERAPRED DS) 10 mg dose pack Take as directed  Qty: 21 Tab, Refills: 0      oxyCODONE IR (ROXICODONE) 5 mg immediate release tablet Take 1 Tab by mouth every six (6) hours as needed for Pain for up to 12 doses. Max Daily Amount: 20 mg.  Qty: 12 Tab, Refills: 0    Associated Diagnoses: Left facial pressure and pain           2. Follow-up Information     Follow up With Details Comments [address] [facility] DEPT OF OTOLARYNGOLOGY   [address]  [phone]    [facility] DEPT OF NEUROLOGY   [address] [address]  [phone]    [facility] EMERGENCY DEPT  If symptoms worsen [address] [facility]  [phone]        Return to ED if worse     Diagnosis     Clinical Impression:   1. Left facial pressure and pain    2. Elevated blood pressure reading    3.  Tobacco dependence

## 2019-01-02 ENCOUNTER — HOSPITAL ENCOUNTER (EMERGENCY)
Age: 29
Discharge: HOME | End: 2019-01-02
Attending: EMERGENCY MEDICINE
Payer: MEDICAID

## 2019-01-02 VITALS
OXYGEN SATURATION: 100 % | WEIGHT: 190 LBS | HEIGHT: 64 IN | DIASTOLIC BLOOD PRESSURE: 81 MMHG | HEART RATE: 76 BPM | RESPIRATION RATE: 18 BRPM | SYSTOLIC BLOOD PRESSURE: 135 MMHG | TEMPERATURE: 97.9 F | BODY MASS INDEX: 32.44 KG/M2

## 2019-01-02 DIAGNOSIS — Z20.2 STD EXPOSURE: Primary | ICD-10-CM

## 2019-01-02 DIAGNOSIS — N76.0 BV (BACTERIAL VAGINOSIS): ICD-10-CM

## 2019-01-02 DIAGNOSIS — B96.89 BV (BACTERIAL VAGINOSIS): ICD-10-CM

## 2019-01-02 LAB
APPEARANCE UR: ABNORMAL
BACTERIA URNS QL MICRO: ABNORMAL /HPF
BILIRUB UR QL: NEGATIVE
CLUE CELLS VAG QL WET PREP: NORMAL
COLOR UR: ABNORMAL
EPITH CASTS URNS QL MICRO: ABNORMAL /LPF
GLUCOSE UR STRIP.AUTO-MCNC: NEGATIVE MG/DL
HCG UR QL: NEGATIVE
HGB UR QL STRIP: ABNORMAL
KETONES UR QL STRIP.AUTO: NEGATIVE MG/DL
KOH PREP SPEC: NORMAL
LEUKOCYTE ESTERASE UR QL STRIP.AUTO: ABNORMAL
NITRITE UR QL STRIP.AUTO: NEGATIVE
PH UR STRIP: 6.5 [PH] (ref 5–8)
PROT UR STRIP-MCNC: NEGATIVE MG/DL
RBC #/AREA URNS HPF: ABNORMAL /HPF (ref 0–5)
SERVICE CMNT-IMP: NORMAL
SP GR UR REFRACTOMETRY: 1.02 (ref 1–1.03)
T VAGINALIS VAG QL WET PREP: NORMAL
UA: UC IF INDICATED,UAUC: ABNORMAL
UROBILINOGEN UR QL STRIP.AUTO: 1 EU/DL (ref 0.2–1)
WBC URNS QL MICRO: ABNORMAL /HPF (ref 0–4)

## 2019-01-02 PROCEDURE — 96372 THER/PROPH/DIAG INJ SC/IM: CPT

## 2019-01-02 PROCEDURE — 87210 SMEAR WET MOUNT SALINE/INK: CPT

## 2019-01-02 PROCEDURE — 99284 EMERGENCY DEPT VISIT MOD MDM: CPT

## 2019-01-02 PROCEDURE — 74011250637 HC RX REV CODE- 250/637: Performed by: PHYSICIAN ASSISTANT

## 2019-01-02 PROCEDURE — 87491 CHLMYD TRACH DNA AMP PROBE: CPT

## 2019-01-02 PROCEDURE — 81025 URINE PREGNANCY TEST: CPT

## 2019-01-02 PROCEDURE — 74011250636 HC RX REV CODE- 250/636: Performed by: PHYSICIAN ASSISTANT

## 2019-01-02 PROCEDURE — 87086 URINE CULTURE/COLONY COUNT: CPT

## 2019-01-02 PROCEDURE — 81001 URINALYSIS AUTO W/SCOPE: CPT

## 2019-01-02 RX ORDER — AZITHROMYCIN 500 MG/1
1000 TABLET, FILM COATED ORAL
Status: COMPLETED | OUTPATIENT
Start: 2019-01-02 | End: 2019-01-02

## 2019-01-02 RX ORDER — METRONIDAZOLE 500 MG/1
500 TABLET ORAL 2 TIMES DAILY
Qty: 14 TAB | Refills: 0 | Status: SHIPPED | OUTPATIENT
Start: 2019-01-02 | End: 2019-01-09

## 2019-01-02 RX ADMIN — AZITHROMYCIN 1000 MG: 500 TABLET, FILM COATED ORAL at 20:45

## 2019-01-02 RX ADMIN — LIDOCAINE HYDROCHLORIDE 250 MG: 10 INJECTION, SOLUTION EPIDURAL; INFILTRATION; INTRACAUDAL; PERINEURAL at 20:45

## 2019-01-03 NOTE — ED NOTES
Pt C/O vaginal irritation and suprapubic pain/pressure. Denies vaginal discharge or bleeding, nausea, vomiting. Emergency Department Nursing Plan of Care       The Nursing Plan of Care is developed from the Nursing assessment and Emergency Department Attending provider initial evaluation. The plan of care may be reviewed in the ED Provider note.     The Plan of Care was developed with the following considerations:   Patient / Family readiness to learn indicated by:verbalized understanding  Persons(s) to be included in education: patient  Barriers to Learning/Limitations:No    Signed     Neto Garinca RN    1/2/2019   8:01 PM

## 2019-01-03 NOTE — ED PROVIDER NOTES
EMERGENCY DEPARTMENT HISTORY AND PHYSICAL EXAM      Date: 2019  Patient Name: Hilda Jefferson    History of Presenting Illness     HPI: Hilda Jefferson is a 25 y.o. female with no sig pmhx presents to the ED for STD exposure 3 days ago. Pt says she had unprotected sex and has had vaginal irritation and suprapubic discomfort since. Currently she has no pain. She denies vaginal d/c, n/v, pelvic pain, painful rash, among other assoc sx's. PCP: Edin Munoz MD    Current Outpatient Medications   Medication Sig Dispense Refill    metroNIDAZOLE (FLAGYL) 500 mg tablet Take 1 Tab by mouth two (2) times a day for 7 days. 14 Tab 0    gabapentin (NEURONTIN) 300 mg capsule Take 300 mg by mouth three (3) times daily. Past History     Past Medical History:  Past Medical History:   Diagnosis Date    Asthma     Asthma     COPD     Other ill-defined conditions(799.89)     gallstones       Past Surgical History:  Past Surgical History:   Procedure Laterality Date    DELIVERY       HX  SECTION      HX CHOLECYSTECTOMY  14    lap juan m - Dr. Blu Hinkle HX GYN         Family History:  History reviewed. No pertinent family history. Social History:  Social History     Tobacco Use    Smoking status: Current Every Day Smoker     Packs/day: 0.50     Years: 14.00     Pack years: 7.00    Smokeless tobacco: Never Used   Substance Use Topics    Alcohol use: No     Comment: Socially    Drug use: No       Allergies:  No Known Allergies      Review of Systems   Review of Systems   Constitutional: Negative for chills, fever and unexpected weight change. Respiratory: Negative for shortness of breath. Cardiovascular: Negative for chest pain and palpitations. Gastrointestinal: Negative for abdominal pain, nausea and vomiting. Genitourinary: Negative for pelvic pain, vaginal bleeding and vaginal discharge.         Vaginal irritation   Musculoskeletal: Negative for arthralgias and myalgias. Skin: Negative for rash. Neurological: Negative for light-headedness and headaches. All other systems reviewed and are negative. Physical Exam     Vitals:    01/02/19 1940   BP: 135/81   Pulse: 76   Resp: 18   Temp: 97.9 °F (36.6 °C)   SpO2: 100%   Weight: 86.2 kg (190 lb)   Height: 5' 4\" (1.626 m)     Physical Exam   Constitutional: She is oriented to person, place, and time. She appears well-developed and well-nourished. No distress. HENT:   Head: Normocephalic and atraumatic. Cardiovascular: Normal rate, regular rhythm and normal heart sounds. Pulmonary/Chest: Effort normal and breath sounds normal.   Abdominal: Soft. Bowel sounds are normal. She exhibits no distension and no mass. There is no tenderness. There is no rebound and no guarding. Neurological: She is alert and oriented to person, place, and time. Skin: Skin is warm and dry. She is not diaphoretic. Psychiatric: She has a normal mood and affect.  Her behavior is normal. Judgment and thought content normal.         Diagnostic Study Results     Labs -     Recent Results (from the past 12 hour(s))   URINALYSIS W/ REFLEX CULTURE    Collection Time: 01/02/19  7:56 PM   Result Value Ref Range    Color YELLOW/STRAW      Appearance CLOUDY (A) CLEAR      Specific gravity 1.025 1.003 - 1.030      pH (UA) 6.5 5.0 - 8.0      Protein NEGATIVE  NEG mg/dL    Glucose NEGATIVE  NEG mg/dL    Ketone NEGATIVE  NEG mg/dL    Bilirubin NEGATIVE  NEG      Blood SMALL (A) NEG      Urobilinogen 1.0 0.2 - 1.0 EU/dL    Nitrites NEGATIVE  NEG      Leukocyte Esterase TRACE (A) NEG      WBC 5-10 0 - 4 /hpf    RBC 0-5 0 - 5 /hpf    Epithelial cells MODERATE (A) FEW /lpf    Bacteria 3+ (A) NEG /hpf    UA:UC IF INDICATED URINE CULTURE ORDERED (A) CNI     ALONSO, OTHER SOURCES    Collection Time: 01/02/19  7:56 PM   Result Value Ref Range    Special Requests: NO SPECIAL REQUESTS      KOH NO YEAST SEEN     WET PREP    Collection Time: 01/02/19  7:56 PM Result Value Ref Range    Clue cells CLUE CELLS PRESENT      Wet prep NO TRICHOMONAS SEEN     HCG URINE, QL. - POC    Collection Time: 01/02/19  7:57 PM   Result Value Ref Range    Pregnancy test,urine (POC) NEGATIVE  NEG         Radiologic Studies -   No orders to display     CT Results  (Last 48 hours)    None        CXR Results  (Last 48 hours)    None            Medical Decision Making   I am the first provider for this patient. I reviewed the vital signs, available nursing notes, past medical history, past surgical history, family history, social history    ED Course:   Initial assessment performed. The patients presenting problems have been discussed, and they are in agreement with the care plan formulated and outlined with them. I have encouraged them to ask questions as they arise throughout their visit. Vital Signs-Reviewed the patient's vital signs. Vitals:    01/02/19 1940   BP: 135/81   BP 1 Location: Left arm   BP Patient Position: At rest   Pulse: 76   Resp: 18   Temp: 97.9 °F (36.6 °C)   SpO2: 100%   Weight: 86.2 kg (190 lb)   Height: 5' 4\" (1.626 m)       Medications Administered During ED Course  Medications   cefTRIAXone (ROCEPHIN) 250 mg in lidocaine (PF) (XYLOCAINE) 10 mg/mL (1 %) IM injection (250 mg IntraMUSCular Given 1/2/19 2045)   azithromycin (ZITHROMAX) tablet 1,000 mg (1,000 mg Oral Given 1/2/19 2045)       Progress Note:  On re evaluation pt is resting comfortably, is requesting discharge, and has no new complaints, changes, or physical findings. Disposition:  D/c home    DISCHARGE NOTE:   I Counseled the patient on diagnosis and care plan. All available lab and imaging results have been reviewed by me and were discussed with the patient, including all incidental findings. The likelihood of other entities in the differential is insufficient to justify any further testing for them. This was explained to the patient.   Patient agrees with plan and agrees to follow up with PCP as recommended, or return to the ED if their symptoms worsen. All medications were reviewed with the patient. All of pt's questions and concerns were addressed. The patient was advised that new or worsening symptoms would require further evaluation and should prompt immediate return to the Emergency Department. Discharge instructions have been provided and explained to the patient, along with reasons to return to the ED. Patient voices understanding and is agreeable with the plan for discharge. Patient is ready to go home. Follow-up Information     Follow up With Specialties Details Why Contact Info    Tawana Ya MD Internal Medicine Schedule an appointment as soon as possible for a visit  [address] [place] [address]  [phone]      [facility] EMERGENCY DEPT Emergency Medicine Go to If symptoms worsen [facility]          Discharge Medication List as of 1/2/2019  9:01 PM          Provider Notes (Medical Decision Making):   DDx: BV, candidiasis, G/C, HSV, trichomoniasis, PID, cervical polyp, rxn to douches/tampons/diaphragm    Procedures:  Procedures    Critical Care Time: none      Diagnosis     Clinical Impression:   1. STD exposure    2.  BV (bacterial vaginosis)

## 2019-01-03 NOTE — DISCHARGE INSTRUCTIONS
Bacterial Vaginosis: Care Instructions  Your Care Instructions    Bacterial vaginosis is a type of vaginal infection. It is caused by excess growth of certain bacteria that are normally found in the vagina. Symptoms can include itching, swelling, pain when you urinate or have sex, and a gray or yellow discharge with a \"fishy\" odor. It is not considered an infection that is spread through sexual contact. Although symptoms can be annoying and uncomfortable, bacterial vaginosis does not usually cause other health problems. However, if you have it while you are pregnant, it can cause complications. While the infection may go away on its own, most doctors use antibiotics to treat it. You may have been prescribed pills or vaginal cream. With treatment, bacterial vaginosis usually clears up in 5 to 7 days. Follow-up care is a key part of your treatment and safety. Be sure to make and go to all appointments, and call your doctor if you are having problems. It's also a good idea to know your test results and keep a list of the medicines you take. How can you care for yourself at home? · Take your antibiotics as directed. Do not stop taking them just because you feel better. You need to take the full course of antibiotics. · Do not eat or drink anything that contains alcohol if you are taking metronidazole (Flagyl). · Keep using your medicine if you start your period. Use pads instead of tampons while using a vaginal cream or suppository. Tampons can absorb the medicine. · Wear loose cotton clothing. Do not wear nylon and other materials that hold body heat and moisture close to the skin. · Do not scratch. Relieve itching with a cold pack or a cool bath. · Do not wash your vaginal area more than once a day. Use plain water or a mild, unscented soap. Do not douche. When should you call for help?   Watch closely for changes in your health, and be sure to contact your doctor if:    · You have unexpected vaginal bleeding.     · You have a fever.     · You have new or increased pain in your vagina or pelvis.     · You are not getting better after 1 week.     · Your symptoms return after you finish the course of your medicine. Where can you learn more? Go to http://vincent-evelyn.info/. Caron Carvalhobs in the search box to learn more about \"Bacterial Vaginosis: Care Instructions. \"  Current as of: May 15, 2018  Content Version: 11.8  © 8088-2801 [employer]. Care instructions adapted under license by [employer] (which disclaims liability or warranty for this information). If you have questions about a medical condition or this instruction, always ask your healthcare professional. [address] any warranty or liability for your use of this information. Patient Education        Exposure to Sexually Transmitted Infections: Care Instructions  Your Care Instructions  Sexually transmitted infections (STIs) are those diseases spread by sexual contact. There are at least 20 different STIs, including chlamydia, gonorrhea, syphilis, and human immunodeficiency virus (HIV), which causes AIDS. Bacteria-caused STIs can be treated and cured. STIs caused by viruses, such as HIV, can be treated but not cured. Some STIs can reduce a woman's chances of getting pregnant in the future. STIs are spread during sexual contact, such as vaginal intercourse and oral or anal sex. Follow-up care is a key part of your treatment and safety. Be sure to make and go to all appointments, and call your doctor if you are having problems. It's also a good idea to know your test results and keep a list of the medicines you take. How can you care for yourself at home? · Your doctor may have given you a shot of antibiotics. If your doctor prescribed antibiotic pills, take them as directed. Do not stop taking them just because you feel better. You need to take the full course of antibiotics.   · Do not have sexual contact while you have symptoms of an STI or are being treated for an STI. · Tell your sex partner (or partners) that he or she will need treatment. · If you are a woman, do not douche. Douching changes the normal balance of bacteria in the vagina and may spread an infection up into your reproductive organs. To prevent exposure to STIs in the future  · Use latex condoms every time you have sex. Use them from the beginning to the end of sexual contact. · Talk to your partner before you have sex. Find out if he or she has or is at risk for any STI. Keep in mind that a person may be able to spread an STI even if he or she does not have symptoms. · Do not have sex if you are being treated for an STI. · Do not have sex with anyone who has symptoms of an STI, such as sores on the genitals or mouth. · Having one sex partner (who does not have STIs and does not have sex with anyone else) is a good way to avoid STIs. When should you call for help? Call your doctor now or seek immediate medical care if:    · You have new pain in your belly or pelvis.     · You have symptoms of a urinary tract infection. These may include:  ? Pain or burning when you urinate. ? A frequent need to urinate without being able to pass much urine. ? Pain in the flank, which is just below the rib cage and above the waist on either side of the back. ? Blood in your urine. ? A fever.     · You have new or worsening pain or swelling in the scrotum.    Watch closely for changes in your health, and be sure to contact your doctor if:    · You have unusual vaginal bleeding.     · You have a discharge from the vagina or penis.     · You have any new symptoms, such as sores, bumps, rashes, blisters, or warts.     · You have itching, tingling, pain, or burning in the genital or anal area.     · You think you may have an STI. Where can you learn more? Go to http://vincent-evelyn.info/.   Enter M454 in the search box to learn more about \"Exposure to Sexually Transmitted Infections: Care Instructions. \"  Current as of: November 27, 2017  Content Version: 11.8  © 1269-8110 [employer], [employer]. Care instructions adapted under license by [employer] (which disclaims liability or warranty for this information). If you have questions about a medical condition or this instruction, always ask your healthcare professional. [place] [address] any warranty or liability for your use of this information.

## 2019-01-03 NOTE — ED TRIAGE NOTES
Pt concerned for STD after unprotected sex on New years marry. Pt c/o \"irriation\" ever since. Unsure if she is having d/c as she just came off her cycle. Pt requesting to be tested for STD's.

## 2019-01-04 LAB
BACTERIA SPEC CULT: NORMAL
C TRACH DNA SPEC QL NAA+PROBE: NEGATIVE
CC UR VC: NORMAL
N GONORRHOEA DNA SPEC QL NAA+PROBE: NEGATIVE
SAMPLE TYPE: NORMAL
SERVICE CMNT-IMP: NORMAL
SERVICE CMNT-IMP: NORMAL
SPECIMEN SOURCE: NORMAL

## 2019-10-30 RX ORDER — GABAPENTIN 300 MG/1
300 CAPSULE ORAL AS NEEDED
COMMUNITY

## 2019-10-30 NOTE — PERIOP NOTES
Dr. Alfreda Leventhal 's office notified patient took colonoscopy prep yesterday. Office to contact patient for further instructions.

## 2019-10-31 ENCOUNTER — ANESTHESIA (OUTPATIENT)
Dept: ENDOSCOPY | Age: 29
End: 2019-10-31
Payer: MEDICAID

## 2019-10-31 ENCOUNTER — HOSPITAL ENCOUNTER (OUTPATIENT)
Age: 29
Setting detail: OUTPATIENT SURGERY
Discharge: HOME | End: 2019-10-31
Attending: INTERNAL MEDICINE | Admitting: INTERNAL MEDICINE
Payer: MEDICAID

## 2019-10-31 ENCOUNTER — ANESTHESIA EVENT (OUTPATIENT)
Dept: ENDOSCOPY | Age: 29
End: 2019-10-31
Payer: MEDICAID

## 2019-10-31 VITALS
TEMPERATURE: 97.6 F | SYSTOLIC BLOOD PRESSURE: 120 MMHG | HEIGHT: 64 IN | HEART RATE: 59 BPM | RESPIRATION RATE: 17 BRPM | WEIGHT: 194.5 LBS | OXYGEN SATURATION: 100 % | BODY MASS INDEX: 33.2 KG/M2 | DIASTOLIC BLOOD PRESSURE: 74 MMHG

## 2019-10-31 LAB — HCG UR QL: NEGATIVE

## 2019-10-31 PROCEDURE — 74011250636 HC RX REV CODE- 250/636: Performed by: NURSE ANESTHETIST, CERTIFIED REGISTERED

## 2019-10-31 PROCEDURE — 88305 TISSUE EXAM BY PATHOLOGIST: CPT

## 2019-10-31 PROCEDURE — 76060000031 HC ANESTHESIA FIRST 0.5 HR: Performed by: INTERNAL MEDICINE

## 2019-10-31 PROCEDURE — 76040000019: Performed by: INTERNAL MEDICINE

## 2019-10-31 PROCEDURE — 81025 URINE PREGNANCY TEST: CPT

## 2019-10-31 PROCEDURE — 77030013992 HC SNR POLYP ENDOSC BSC -B: Performed by: INTERNAL MEDICINE

## 2019-10-31 PROCEDURE — 77030021593 HC FCPS BIOP ENDOSC BSC -A: Performed by: INTERNAL MEDICINE

## 2019-10-31 PROCEDURE — 74011250636 HC RX REV CODE- 250/636: Performed by: INTERNAL MEDICINE

## 2019-10-31 RX ORDER — PROPOFOL 10 MG/ML
INJECTION, EMULSION INTRAVENOUS AS NEEDED
Status: DISCONTINUED | OUTPATIENT
Start: 2019-10-31 | End: 2019-10-31 | Stop reason: HOSPADM

## 2019-10-31 RX ORDER — SODIUM CHLORIDE 0.9 % (FLUSH) 0.9 %
5-40 SYRINGE (ML) INJECTION EVERY 8 HOURS
Status: DISCONTINUED | OUTPATIENT
Start: 2019-10-31 | End: 2019-10-31 | Stop reason: HOSPADM

## 2019-10-31 RX ORDER — ATROPINE SULFATE 0.1 MG/ML
0.5 INJECTION INTRAVENOUS
Status: DISCONTINUED | OUTPATIENT
Start: 2019-10-31 | End: 2019-10-31 | Stop reason: HOSPADM

## 2019-10-31 RX ORDER — FLUMAZENIL 0.1 MG/ML
0.2 INJECTION INTRAVENOUS
Status: DISCONTINUED | OUTPATIENT
Start: 2019-10-31 | End: 2019-10-31 | Stop reason: HOSPADM

## 2019-10-31 RX ORDER — EPINEPHRINE 0.1 MG/ML
1 INJECTION INTRACARDIAC; INTRAVENOUS
Status: DISCONTINUED | OUTPATIENT
Start: 2019-10-31 | End: 2019-10-31 | Stop reason: HOSPADM

## 2019-10-31 RX ORDER — SODIUM CHLORIDE 9 MG/ML
75 INJECTION, SOLUTION INTRAVENOUS CONTINUOUS
Status: DISCONTINUED | OUTPATIENT
Start: 2019-10-31 | End: 2019-10-31 | Stop reason: HOSPADM

## 2019-10-31 RX ORDER — SODIUM CHLORIDE 0.9 % (FLUSH) 0.9 %
5-40 SYRINGE (ML) INJECTION AS NEEDED
Status: DISCONTINUED | OUTPATIENT
Start: 2019-10-31 | End: 2019-10-31 | Stop reason: HOSPADM

## 2019-10-31 RX ORDER — NALOXONE HYDROCHLORIDE 0.4 MG/ML
0.4 INJECTION, SOLUTION INTRAMUSCULAR; INTRAVENOUS; SUBCUTANEOUS
Status: DISCONTINUED | OUTPATIENT
Start: 2019-10-31 | End: 2019-10-31 | Stop reason: HOSPADM

## 2019-10-31 RX ORDER — DEXTROMETHORPHAN/PSEUDOEPHED 2.5-7.5/.8
1.2 DROPS ORAL
Status: DISCONTINUED | OUTPATIENT
Start: 2019-10-31 | End: 2019-10-31 | Stop reason: HOSPADM

## 2019-10-31 RX ADMIN — SODIUM CHLORIDE 75 ML/HR: 900 INJECTION, SOLUTION INTRAVENOUS at 08:16

## 2019-10-31 RX ADMIN — PROPOFOL 300 MG: 10 INJECTION, EMULSION INTRAVENOUS at 09:02

## 2019-10-31 NOTE — ANESTHESIA PREPROCEDURE EVALUATION
Anesthetic History   No history of anesthetic complications            Review of Systems / Medical History  Patient summary reviewed, nursing notes reviewed and pertinent labs reviewed    Pulmonary    COPD: moderate    Sleep apnea  Smoker  Asthma     Comments: Current Every Day Smoker - 7 pack years   Neuro/Psych             Comments: Neuropathy Cardiovascular  Within defined limits                Exercise tolerance: >4 METS     GI/Hepatic/Renal  Within defined limits              Endo/Other        Obesity     Other Findings              Physical Exam    Airway  Mallampati: II  TM Distance: > 6 cm  Neck ROM: normal range of motion   Mouth opening: Normal     Cardiovascular  Regular rate and rhythm,  S1 and S2 normal,  no murmur, click, rub, or gallop             Dental      Comments: Some missing teeth, some broken teeth   Pulmonary  Breath sounds clear to auscultation               Abdominal  GI exam deferred       Other Findings            Anesthetic Plan    ASA: 2  Anesthesia type: total IV anesthesia    Monitoring Plan: BIS      Induction: Intravenous  Anesthetic plan and risks discussed with: Patient

## 2019-10-31 NOTE — ROUTINE PROCESS
Casandra Clay  1990  [id number]    Situation:  Verbal report received from: Corey Dior RN  Procedure: Procedure(s):  COLONOSCOPY  COLON BIOPSY  ENDOSCOPIC POLYPECTOMY    Background:    Preoperative diagnosis: ABNORMAL CT OF ABDOMEN  Postoperative diagnosis: polyps, hemorrhoids    :  Dr. Corrie Ayala  Assistant(s): Endoscopy RN-1: Gerry Glass RN  Endoscopy RN-2: Michelle MURPHY    Specimens:   ID Type Source Tests Collected by Time Destination   1 : random colon bx Preservative Random colon  Bobby Santana MD 10/31/2019 6149 Pathology   2 : Rectal colon polyp Preservative Rectal  Bobby Santana MD 10/31/2019 0901 Pathology     H. Pylori  no    Assessment:  Intra-procedure medications     Anesthesia gave intra-procedure sedation and medications, see anesthesia flow sheet yes    Intravenous fluids: NS@ KVO     Vital signs stable     Abdominal assessment: round and soft     Recommendation:  Discharge patient per MD order.   Family or Friend Esther, godmother  Permission to share finding with family or friend yes

## 2019-10-31 NOTE — PROCEDURES
NAME:  Flakita Varela   :   1990   MRN:   [id number]     Date/Time:  10/31/2019 9:03 AM    Colonoscopy Operative Report    Procedure Type:   Colonoscopy with biopsy, polypectomy (cold snare)     Indications:     Diarrhea, Abnormal GI imaging  Pre-operative Diagnosis: see indication above  Post-operative Diagnosis:  See findings below  :  Shelley Roque MD  Referring Provider: --Sita Sifuentes MD    Exam:  Airway: clear, no airway problems anticipated  Heart: RRR, without gallops or rubs  Lungs: clear bilaterally without wheezes, crackles, or rhonchi  Abdomen: soft, nontender, nondistended, bowel sounds present  Mental Status: awake, alert and oriented to person, place and time    Sedation:  MAC anesthesia Propofol  Procedure Details:  After informed consent was obtained with all risks and benefits of procedure explained and preoperative exam completed, the patient was taken to the endoscopy suite and placed in the left lateral decubitus position. Upon sequential sedation as per above, a digital rectal exam was performed demonstrating internal hemorrhoids. The Olympus videocolonoscope  was inserted in the rectum and carefully advanced to the cecum, which was identified by the ileocecal valve and appendiceal orifice. The quality of preparation was good. The colonoscope was slowly withdrawn with careful evaluation between folds. Findings:  1. Normal terminal ileum  2. 5 mm sessile rectal polyp. Removed by cold snare polypectomy  3. Small internal hemorrhoids  4. Otherwise normal ileo-colonoscopy. Whole colon biopsies taken to evaluate for microscopic colitis    Specimen Removed:  1. Whole colon 2. Rectal polyp  Complications: None. EBL:  None. Impression:   1. Normal terminal ileum  2. 5 mm sessile rectal polyp. Removed by cold snare polypectomy  3. Small internal hemorrhoids  4. Otherwise normal ileo-colonoscopy.  Whole colon biopsies taken to evaluate for microscopic colitis    Recommendations:   1. Follow up pathology  2. Repeat Colonoscopy in 5 years if adenoma vs at age 37 if hyperplastic    Discharge Disposition:  Home in the company of a  when able to ambulate.       Che Rock MD

## 2019-10-31 NOTE — DISCHARGE INSTRUCTIONS
Sanjuana Larkin  [id number]  1990    COLON DISCHARGE INSTRUCTIONS  Discomfort:  Redness at IV site- apply warm compress to area; if redness or soreness persist- contact your physician  There may be a slight amount of blood passed from the rectum  Gaseous discomfort- walking, belching will help relieve any discomfort  You may not operate a vehicle for 12 hours  You may not engage in an occupation involving machinery or appliances for rest of today  You may not drink alcoholic beverages for at least 12 hours  Avoid making any critical decisions for at least 24 hour  DIET:   Regular diet. - however -  remember your colon is empty and a heavy meal will produce gas. Avoid these foods:  vegetables, fried / greasy foods, carbonated drinks for today  MEDICATION:  Per Medication Reconciliation       ACTIVITY:  You may not resume your normal daily activities until tomorrow AM; it is recommended that you spend the remainder of the day resting -  avoid any strenuous activity. CALL M.D. ANY SIGN OF:   Increasing pain, nausea, vomiting  Abdominal distension (swelling)  New increased bleeding (oral or rectal)  Fever (chills)  Pain in chest area  Bloody discharge from nose or mouth  Shortness of breath    You may  take any Advil, Aspirin, Ibuprofen, Motrin, Aleve, or Goodys for 10 days, ONLY  Tylenol as needed for pain. IMPRESSION:  Impression:   1. Normal terminal ileum  2. 5 mm sessile rectal polyp. Removed by cold snare polypectomy  3. Small internal hemorrhoids  4. Otherwise normal ileo-colonoscopy. Whole colon biopsies taken to evaluate for microscopic colitis     Recommendations:   1. Follow up pathology  2.  Repeat Colonoscopy in 5 years if adenoma vs at age 37 if hyperplastic    Follow-up Instructions:   Call Dr. Isaac Tan for the results of procedure / biopsy in 7-10 days  Telephone # [phone]    Chloe Galicia MD

## 2019-10-31 NOTE — ANESTHESIA POSTPROCEDURE EVALUATION
Procedure(s):  COLONOSCOPY  COLON BIOPSY  ENDOSCOPIC POLYPECTOMY. total IV anesthesia    Anesthesia Post Evaluation        Patient location during evaluation: PACU  Note status: Adequate. Level of consciousness: responsive to verbal stimuli and sleepy but conscious  Pain management: satisfactory to patient  Airway patency: patent  Anesthetic complications: no  Cardiovascular status: acceptable  Respiratory status: acceptable  Hydration status: acceptable  Comments: +Post-Anesthesia Evaluation and Assessment    Patient: Indio Sanders MRN: [id number]  SSN: [id number]   YOB: 1990  Age: 30 y.o. Sex: female      Cardiovascular Function/Vital Signs    /55   Pulse 81   Temp 36.6 °C (97.9 °F)   Resp 20   Ht 5' 4\" (1.626 m)   Wt 88.2 kg (194 lb 8 oz)   SpO2 99%   Breastfeeding? No   BMI 33.39 kg/m²     Patient is status post Procedure(s):  COLONOSCOPY  COLON BIOPSY  ENDOSCOPIC POLYPECTOMY. Nausea/Vomiting: Controlled. Postoperative hydration reviewed and adequate. Pain:  Pain Scale 1: Numeric (0 - 10) (10/31/19 6192)  Pain Intensity 1: 0 (10/31/19 9724)   Managed. Neurological Status: At baseline. Mental Status and Level of Consciousness: Arousable. Pulmonary Status:   O2 Device: Room air(Simultaneous filing. User may not have seen previous data.) (10/31/19 4997)   Adequate oxygenation and airway patent. Complications related to anesthesia: None    Post-anesthesia assessment completed. No concerns.     Signed By: Barby Lovett MD    10/31/2019  Post anesthesia nausea and vomiting:  controlled      Vitals Value Taken Time   /55 10/31/2019  9:08 AM   Temp     Pulse 81 10/31/2019  9:08 AM   Resp 20 10/31/2019  9:08 AM   SpO2 99 % 10/31/2019  9:08 AM

## 2019-10-31 NOTE — H&P
Gastroenterology Outpatient History and Physical    Patient: Cathleen Gutierrez    Physician: Chelsea Ryan MD    Chief Complaint: AP/Diarrhea  History of Present Illness: Abnormal imaging    History:  Past Medical History:   Diagnosis Date    Asthma     COPD     Ill-defined condition     neuropathy    Other ill-defined conditions(799.89)     gallstones      Past Surgical History:   Procedure Laterality Date    DELIVERY       HX  SECTION      HX LAP CHOLECYSTECTOMY  14      Social History     Socioeconomic History    Marital status: SINGLE     Spouse name: Not on file    Number of children: Not on file    Years of education: Not on file    Highest education level: Not on file   Tobacco Use    Smoking status: Current Every Day Smoker     Packs/day: 0.50     Years: 14.00     Pack years: 7.00    Smokeless tobacco: Never Used   Substance and Sexual Activity    Alcohol use: Yes     Alcohol/week: 1.0 standard drinks     Types: 1 Glasses of wine per week     Comment: rare now /heavy drinking past hx    Drug use: Not Currently     Types: Marijuana    Sexual activity: Yes     Partners: Female     Birth control/protection: Condom    History reviewed. No pertinent family history. Patient Active Problem List   Diagnosis Code    Acute cholecystitis K81.0    Asthma J45.909    Obesity E66.9    Obstructive sleep apnea G47.33    Abdominal pain R10.9       Allergies: No Known Allergies  Medications:   Prior to Admission medications    Medication Sig Start Date End Date Taking? Authorizing Provider   gabapentin (NEURONTIN) 300 mg capsule Take 300 mg by mouth as needed. Yes Provider, Historical   ALBUTEROL IN Take  by inhalation as needed. Yes Provider, Historical   medroxyprogesterone acetate (DEPO-PROVERA IM) by IntraMUSCular route.  Every 3 months   Next scheduled injection 19   Yes Provider, Historical     Physical Exam:   Vital Signs: Last menstrual period 04/30/2019.   General: well developed, well nourished   HEENT: unremarkable   Heart: regular rhythm no mumur    Lungs: clear   Abdominal:  benign   Neurological: unremarkable   Extremities: no edema     Findings/Diagnosis: AP/diarrhea  Plan of Care/Planned Procedure: Colonoscopy with conscious/deep sedation    Signed:  Che Rock MD 10/31/2019

## 2019-10-31 NOTE — PROGRESS NOTES
..Anesthesia reports 300mg Propofol, 0mg Lidocaine and 300mL NS given during procedure. Received report from anesthesia staff on vital signs and status of patient.

## 2020-09-21 ENCOUNTER — APPOINTMENT (OUTPATIENT)
Dept: GENERAL RADIOLOGY | Age: 30
End: 2020-09-21
Attending: PHYSICIAN ASSISTANT
Payer: MEDICAID

## 2020-09-21 ENCOUNTER — HOSPITAL ENCOUNTER (EMERGENCY)
Age: 30
Discharge: HOME | End: 2020-09-21
Attending: EMERGENCY MEDICINE
Payer: MEDICAID

## 2020-09-21 VITALS
DIASTOLIC BLOOD PRESSURE: 99 MMHG | TEMPERATURE: 98.5 F | SYSTOLIC BLOOD PRESSURE: 141 MMHG | WEIGHT: 215 LBS | BODY MASS INDEX: 36.7 KG/M2 | RESPIRATION RATE: 16 BRPM | OXYGEN SATURATION: 97 % | HEART RATE: 95 BPM | HEIGHT: 64 IN

## 2020-09-21 DIAGNOSIS — M54.12 CERVICAL RADICULOPATHY: Primary | ICD-10-CM

## 2020-09-21 DIAGNOSIS — M79.18 RHOMBOID PAIN: ICD-10-CM

## 2020-09-21 PROCEDURE — 74011250636 HC RX REV CODE- 250/636: Performed by: PHYSICIAN ASSISTANT

## 2020-09-21 PROCEDURE — 99282 EMERGENCY DEPT VISIT SF MDM: CPT

## 2020-09-21 PROCEDURE — 72050 X-RAY EXAM NECK SPINE 4/5VWS: CPT

## 2020-09-21 PROCEDURE — 96372 THER/PROPH/DIAG INJ SC/IM: CPT

## 2020-09-21 RX ORDER — KETOROLAC TROMETHAMINE 30 MG/ML
30 INJECTION, SOLUTION INTRAMUSCULAR; INTRAVENOUS
Status: COMPLETED | OUTPATIENT
Start: 2020-09-21 | End: 2020-09-21

## 2020-09-21 RX ORDER — LIDOCAINE 50 MG/G
PATCH TOPICAL
Qty: 5 EACH | Refills: 0 | Status: SHIPPED | OUTPATIENT
Start: 2020-09-21

## 2020-09-21 RX ORDER — CYCLOBENZAPRINE HCL 10 MG
TABLET ORAL
COMMUNITY

## 2020-09-21 RX ORDER — DICLOFENAC SODIUM 75 MG/1
75 TABLET, DELAYED RELEASE ORAL 2 TIMES DAILY
Qty: 30 TAB | Refills: 0 | Status: SHIPPED | OUTPATIENT
Start: 2020-09-21

## 2020-09-21 RX ADMIN — KETOROLAC TROMETHAMINE 30 MG: 30 INJECTION, SOLUTION INTRAMUSCULAR; INTRAVENOUS at 14:47

## 2020-09-21 NOTE — ED NOTES
Pt presents ambulatory to ED complaining of neck pain radiating in between shoulder blades. Pt denies injury/trauma. Pt reports taking Flexeril and Gabapentin this morning, with no relief. Pt reports taking Ibuprofen yesterday, with no relief. Pt is alert and oriented x 4, RR even and unlabored, skin is warm and dry. Assesment completed and pt updated on plan of care. Emergency Department Nursing Plan of Care       The Nursing Plan of Care is developed from the Nursing assessment and Emergency Department Attending provider initial evaluation. The plan of care may be reviewed in the ED Provider note.     The Plan of Care was developed with the following considerations:   Patient / Family readiness to learn indicated by:verbalized understanding  Persons(s) to be included in education: patient  Barriers to Learning/Limitations:No    [address]    9/21/2020   2:34 PM

## 2020-09-21 NOTE — ED NOTES
Discharge instructions were given to the patient by Deandra Tucker.     The patient left the Emergency Department ambulatory, alert and oriented and in no acute distress with 2 prescriptions. The patient was encouraged to call or return to the ED for worsening issues or problems and was encouraged to schedule a follow up appointment for continuing care. The patient verbalized understanding of discharge instructions and prescriptions, all questions were answered. The patient has no further concerns at this time.

## 2020-09-21 NOTE — ED TRIAGE NOTES
CC generalized pain, worse to left shoulder blade. Reports a hx of occipital neuropathy and has been taking gabapentin and flexeril without relief.

## 2020-09-21 NOTE — DISCHARGE INSTRUCTIONS
Patient Education        Pinched Nerve in the Neck: Care Instructions  Your Care Instructions  A pinched nerve in the neck happens when a vertebra or disc in the upper part of your spine is damaged. This damage can happen because of an injury. Or it can just happen with age. The changes caused by the damage may put pressure on a nearby nerve root, pinching it. This causes symptoms such as sharp pain in your neck, shoulder, arm, hand, or back. You may also have tingling or numbness. Sometimes it makes your arm weaker. The symptoms are usually worse when you turn your head or strain your neck. For many people, the symptoms get better over time and finally go away. Early treatment usually includes medicines for pain and swelling. Sometimes physical therapy and special exercises may help. Follow-up care is a key part of your treatment and safety. Be sure to make and go to all appointments, and call your doctor if you are having problems. It's also a good idea to know your test results and keep a list of the medicines you take. How can you care for yourself at home? · Be safe with medicines. Read and follow all instructions on the label. ? If the doctor gave you a prescription medicine for pain, take it as prescribed. ? If you are not taking a prescription pain medicine, ask your doctor if you can take an over-the-counter medicine. · Try using a heating pad on a low or medium setting for 15 to 20 minutes every 2 or 3 hours. Try a warm shower in place of one session with the heating pad. You can also buy single-use heat wraps that last up to 8 hours. · You can also try an ice pack for 10 to 15 minutes every 2 to 3 hours. There isn't strong evidence that either heat or ice will help. But you can try them to see if they help you. · Don't spend too long in one position. Take short breaks to move around and change positions. · Wear a seat belt and shoulder harness when you are in a car.   · Sleep with a pillow under your head and neck that keeps your neck straight. · If you were given a neck brace (cervical collar) to limit neck motion, wear it as instructed for as many days as your doctor tells you to. Do not wear it longer than you were told to. Wearing a brace for too long can lead to neck stiffness and can weaken the neck muscles. · Follow your doctor's instructions for gentle neck-stretching exercises. · Do not smoke. Smoking can slow healing of your discs. If you need help quitting, talk to your doctor about stop-smoking programs and medicines. These can increase your chances of quitting for good. · Avoid strenuous work or exercise until your doctor says it is okay. When should you call for help? Call 911 anytime you think you may need emergency care. For example, call if:    · You are unable to move an arm or a leg at all. Call your doctor now or seek immediate medical care if:    · You have new or worse symptoms in your arms, legs, chest, belly, or buttocks. Symptoms may include:  ? Numbness or tingling. ? Weakness. ? Pain.     · You lose bladder or bowel control. Watch closely for changes in your health, and be sure to contact your doctor if:    · You are not getting better as expected. Where can you learn more? Go to http://www.gray.com/  Enter B920 in the search box to learn more about \"Pinched Nerve in the Neck: Care Instructions. \"  Current as of: March 2, 2020               Content Version: 12.6  © 4620-1844 [employer]. Care instructions adapted under license by [employer] (which disclaims liability or warranty for this information). If you have questions about a medical condition or this instruction, always ask your healthcare professional. [place] [address] any warranty or liability for your use of this information.

## 2020-09-21 NOTE — ED PROVIDER NOTES
EMERGENCY DEPARTMENT HISTORY AND PHYSICAL EXAM      Date: 2020  Patient Name: Haja Joseph    History of Presenting Illness     Chief Complaint   Patient presents with    Neuropathy     History Provided By: Patient    HPI: Haja Joseph, 34 y.o. female with medical history significant for COPD, neuropathy, tobacco abuse who presents via private vehicle to the ED with cc of acute moderate persistent and worsening posterior neck pain radiating to left shoulder X 4 days. No known injury or trauma. 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 No heavy lifting. Endorses taking gabapentin and Flexeril without relief of symptoms. Denies fever, chills, nausea, vomiting, numbness, tingling, focal weakness, chest pain, shortness of breath, lightheadedness, dizziness, headache, vision disturbance. PCP: Maddie Harris MD    There are no other complaints, changes, or physical findings at this time. No current facility-administered medications on file prior to encounter. Current Outpatient Medications on File Prior to Encounter   Medication Sig Dispense Refill    cyclobenzaprine (FLEXERIL) 10 mg tablet Take  by mouth three (3) times daily as needed for Muscle Spasm(s).  gabapentin (NEURONTIN) 300 mg capsule Take 300 mg by mouth as needed.  ALBUTEROL IN Take  by inhalation as needed.  medroxyprogesterone acetate (DEPO-PROVERA IM) by IntraMUSCular route.  Every 3 months   Next scheduled injection 19       Past History     Past Medical History:  Past Medical History:   Diagnosis Date    Asthma     COPD     Ill-defined condition     neuropathy    Other ill-defined conditions(799.89)     gallstones     Past Surgical History:  Past Surgical History:   Procedure Laterality Date    COLONOSCOPY N/A 10/31/2019    COLONOSCOPY performed by Charmayne Oram, [facility] at [facility] END[facility]COPY    DELIVERY       HX  SECTION      HX LAP CHOLECYSTECTOMY  14     Family History:  History reviewed. No pertinent family history. Social History:  Social History     Tobacco Use    Smoking status: Current Every Day Smoker     Packs/day: 0.50     Years: 14.00     Pack years: 7.00    Smokeless tobacco: Never Used   Substance Use Topics    Alcohol use: Yes     Alcohol/week: 1.0 standard drinks     Types: 1 Glasses of wine per week     Comment: rare now /heavy drinking past hx    Drug use: Not Currently     Types: Marijuana     Allergies:  No Known Allergies  Review of Systems   Review of Systems   Constitutional: Negative for activity change, chills, diaphoresis, fatigue and fever. HENT: Negative for ear discharge, ear pain, hearing loss, nosebleeds, rhinorrhea and voice change. Eyes: Negative for photophobia, pain and visual disturbance. Respiratory: Negative for apnea, cough and shortness of breath. Cardiovascular: Negative for chest pain and leg swelling. Gastrointestinal: Negative for abdominal pain, diarrhea, nausea and vomiting. Genitourinary: Negative for difficulty urinating, dysuria and hematuria. Musculoskeletal: Positive for arthralgias, neck pain and neck stiffness. Negative for back pain, gait problem, joint swelling and myalgias. Skin: Negative. Negative for wound. Neurological: Negative for dizziness, syncope, weakness, light-headedness, numbness and headaches. Psychiatric/Behavioral: Negative. Physical Exam   Physical Exam  Vitals signs and nursing note reviewed. Constitutional:       General: She is not in acute distress. Appearance: She is well-developed. She is not diaphoretic. HENT:      Head: Normocephalic and atraumatic. Right Ear: Hearing and external ear normal.      Left Ear: Hearing and external ear normal.      Nose: Nose normal.   Eyes:      Conjunctiva/sclera: Conjunctivae normal.      Pupils: Pupils are equal, round, and reactive to light. Neck:      Musculoskeletal: Normal range of motion. Normal range of motion. Pain with movement, spinous process tenderness and muscular tenderness present. No edema, erythema, neck rigidity, crepitus, injury or torticollis. Trachea: Trachea and phonation normal.   Cardiovascular:      Pulses:           Radial pulses are 2+ on the right side and 2+ on the left side. Pulmonary:      Effort: Pulmonary effort is normal. No respiratory distress. Musculoskeletal: Normal range of motion. Comments: No crepitus, deformity, step-off, edema, instability. Reproducible pain and tenderness on palpation of cervical spine. NVI. Skin:     General: Skin is warm and dry. Neurological:      General: No focal deficit present. Mental Status: She is alert and oriented to person, place, and time. GCS: GCS eye subscore is 4. GCS verbal subscore is 5. GCS motor subscore is 6. Cranial Nerves: Cranial nerves are intact. Sensory: Sensation is intact. No sensory deficit. Motor: Motor function is intact. Gait: Gait is intact. Psychiatric:         Behavior: Behavior normal.         Thought Content: Thought content normal.         Judgment: Judgment normal.       Diagnostic Study Results   Labs -   No results found for this or any previous visit (from the past 12 hour(s)). Radiologic Studies -   XR SPINE CERV 4 OR 5 V   Final Result   IMPRESSION: Normal cervical spine. Xr Spine Cerv 4 Or 5 V    Result Date: 9/21/2020  IMPRESSION: Normal cervical spine. Medical Decision Making   I am the first provider for this patient. I reviewed the vital signs, available nursing notes, past medical history, past surgical history, family history and social history. Vital Signs-Reviewed the patient's vital signs.   Patient Vitals for the past 24 hrs:   Temp Pulse Resp BP SpO2   09/21/20 1417 98.5 °F (36.9 °C) 95 16 (!) 141/99 97 %     Pulse Oximetry Analysis - 97% on RA and normal    Records Reviewed: Nursing Notes, Old Medical Records, Previous Radiology Studies and Previous Laboratory Studies    Provider Notes (Medical Decision Making):   Patient presents with neck pain. DDx: Trapezius muscle strain, SCM strain, contusion, cervical radiculopathy. NO evidence of cervical spine injury or fracture, carotid dissection. ED Course:   Initial assessment performed. The patients presenting problems have been discussed, and they are in agreement with the care plan formulated and outlined with them. I have encouraged them to ask questions as they arise throughout their visit. ED Course as of Sep 21 1523   Mon Sep 21, 2020   1437 Pt denies chance of pregnancy. [SM]      ED Course User Index  [SM] Nakita Leal PA-C       Progress Note:   Updated pt on all returned results and findings. Discussed the importance of proper follow up as referred below along with return precautions. Pt in agreement with the care plan and expresses agreement with and understanding of all items discussed. Disposition:  3:23 PM  I have discussed with patient their diagnosis, treatment, and follow up plan. The patient agrees to follow up as outlined in discharge paperwork and also to return to the ED with any worsening. Maryann Levin PA-C      PLAN:  1. Current Discharge Medication List      START taking these medications    Details   diclofenac EC (VOLTAREN) 75 mg EC tablet Take 1 Tab by mouth two (2) times a day. Qty: 30 Tab, Refills: 0      lidocaine (LIDODERM) 5 % Apply patch to the affected area for 12 hours a day and remove for 12 hours a day. Qty: 5 Each, Refills: 0         CONTINUE these medications which have NOT CHANGED    Details   cyclobenzaprine (FLEXERIL) 10 mg tablet Take  by mouth three (3) times daily as needed for Muscle Spasm(s). gabapentin (NEURONTIN) 300 mg capsule Take 300 mg by mouth as needed. ALBUTEROL IN Take  by inhalation as needed. medroxyprogesterone acetate (DEPO-PROVERA IM) by IntraMUSCular route.  Every 3 months   Next scheduled injection 11/08/19           2. Follow-up Information     Follow up With Specialties Details Why Contact Info    Sarah Jean-Baptiste MD Internal Medicine Schedule an appointment as soon as possible for a visit in 1 week As needed, If symptoms worsen [address] [place] [address]  [phone]          Return to ED if worse     Diagnosis     Clinical Impression:   1. Cervical radiculopathy    2. Rhomboid pain            Please note that this dictation was completed with Dragon, computer voice recognition software. Quite often unanticipated grammatical, syntax, homophones, and other interpretive errors are inadvertently transcribed by the computer software. Please disregard these errors. Additionally, please excuse any errors that have escaped final proofreading.

## 2022-07-15 ENCOUNTER — HOSPITAL ENCOUNTER (EMERGENCY)
Age: 32
Discharge: HOME | End: 2022-07-15
Attending: EMERGENCY MEDICINE
Payer: MEDICAID

## 2022-07-15 VITALS
DIASTOLIC BLOOD PRESSURE: 94 MMHG | HEIGHT: 64 IN | BODY MASS INDEX: 37.05 KG/M2 | OXYGEN SATURATION: 98 % | HEART RATE: 80 BPM | WEIGHT: 217 LBS | TEMPERATURE: 98.9 F | RESPIRATION RATE: 20 BRPM | SYSTOLIC BLOOD PRESSURE: 142 MMHG

## 2022-07-15 DIAGNOSIS — G43.909 MIGRAINE WITHOUT STATUS MIGRAINOSUS, NOT INTRACTABLE, UNSPECIFIED MIGRAINE TYPE: Primary | ICD-10-CM

## 2022-07-15 PROCEDURE — 99283 EMERGENCY DEPT VISIT LOW MDM: CPT

## 2022-07-15 PROCEDURE — 74011250637 HC RX REV CODE- 250/637: Performed by: EMERGENCY MEDICINE

## 2022-07-15 RX ORDER — BUTALBITAL, ACETAMINOPHEN AND CAFFEINE 300; 40; 50 MG/1; MG/1; MG/1
1 CAPSULE ORAL
Qty: 12 CAPSULE | Refills: 0 | Status: SHIPPED | OUTPATIENT
Start: 2022-07-15

## 2022-07-15 RX ORDER — BUTALBITAL, ACETAMINOPHEN AND CAFFEINE 50; 325; 40 MG/1; MG/1; MG/1
1 TABLET ORAL
Status: COMPLETED | OUTPATIENT
Start: 2022-07-15 | End: 2022-07-15

## 2022-07-15 RX ORDER — MONTELUKAST SODIUM 10 MG/1
10 TABLET ORAL DAILY
COMMUNITY
Start: 2022-05-03

## 2022-07-15 RX ADMIN — BUTALBITAL, ACETAMINOPHEN, AND CAFFEINE 1 TABLET: 50; 325; 40 TABLET ORAL at 21:45

## 2022-07-16 NOTE — ED PROVIDER NOTES
27 y.o. female with a history of asthma, occipital neuralgia, as well as migraine headaches who reportedly used to follow with [facility] neurology and was prescribed medicine to prevent migraine headaches but she reportedly did not take it because her symptoms seem to be always associated with her occipital neuralgia which she took gabapentin for. She states that she had a recent appointment scheduled with her neurologist but she did not go. She presents to the emergency department today noting a right-sided headache that has been waxing and waning for the last 2 weeks and returned today at about 1300. She denies any significant photophobia but does note some mild nausea and phonophobia. She denies any vision changes, numbness, weakness, fever, URI symptoms, or any other medical concerns. No recent head injury. She states that she has had prior negative imaging of her head with her neurologist.  [facility] and [facility] NSAIDs and muscle relaxers which she has been taking to some improvement today they have not helped. She declines any shots of medication IV medications at this time. Past Medical History:   Diagnosis Date    Asthma     COPD     Ill-defined condition     neuropathy    Other ill-defined conditions(799.89)     gallstones       Past Surgical History:   Procedure Laterality Date    COLONOSCOPY N/A 10/31/2019    COLONOSCOPY performed by Sophia Rosen MD at [facility] ENDOSCOPY    DELIVERY       HX  SECTION      HX LAP CHOLECYSTECTOMY  14         No family history on file.     Social History     Socioeconomic History    Marital status: SINGLE     Spouse name: Not on file    Number of children: Not on file    Years of education: Not on file    Highest education level: Not on file   Occupational History    Not on file   Tobacco Use    Smoking status: Current Every Day Smoker     Packs/day: 0.50     Years: 14.00 Pack years: 7.00    Smokeless tobacco: Never Used   Substance and Sexual Activity    Alcohol use: Yes     Alcohol/week: 1.0 standard drink     Types: 1 Glasses of wine per week     Comment: rare now /heavy drinking past hx    Drug use: Not Currently     Types: Marijuana    Sexual activity: Yes     Partners: Female     Birth control/protection: Condom   Other Topics Concern    Not on file   Social History Narrative    Not on file     Social Determinants of Health     Financial Resource Strain:     Difficulty of Paying Living Expenses: Not on file   Food Insecurity:     Worried About Running Out of Food in the Last Year: Not on file    [place] of Food in the Last Year: Not on file   Transportation Needs:     Lack of Transportation (Medical): Not on file    Lack of Transportation (Non-Medical): Not on file   Physical Activity:     Days of Exercise per Week: Not on file    Minutes of Exercise per Session: Not on file   Stress:     Feeling of Stress : Not on file   Social Connections:     Frequency of Communication with Friends and Family: Not on file    Frequency of Social Gatherings with Friends and Family: Not on file    Attends Jehovah's witness Services: Not on file    Active Member of [address] [place] [address] or Organizations: Not on file    Attends Club or Organization Meetings: Not on file    Marital Status: Not on file   Intimate Partner Violence:     Fear of Current or Ex-Partner: Not on file    Emotionally Abused: Not on file    Physically Abused: Not on file    Sexually Abused: Not on file   Housing Stability:     Unable to Pay for Housing in the Last Year: Not on file    Number of [place] in the Last Year: Not on file    Unstable Housing in the Last Year: Not on file         ALLERGIES: Patient has no known allergies. Review of Systems   Constitutional: Negative for activity change, appetite change, chills and fever.    HENT: Negative for congestion, rhinorrhea, sinus pressure, sneezing and sore throat. Eyes: Negative for photophobia and visual disturbance. Respiratory: Negative for cough and shortness of breath. Cardiovascular: Negative for chest pain. Gastrointestinal: Negative for abdominal pain, blood in stool, constipation, diarrhea, nausea and vomiting. Genitourinary: Negative for difficulty urinating, dysuria, flank pain, frequency, hematuria, menstrual problem, urgency, vaginal bleeding and vaginal discharge. Musculoskeletal: Negative for arthralgias, back pain, myalgias and neck pain. Skin: Negative for rash and wound. Neurological: Positive for headaches. Negative for syncope, facial asymmetry, speech difficulty, weakness and numbness. Psychiatric/Behavioral: Negative for self-injury and suicidal ideas. All other systems reviewed and are negative. Vitals:    07/15/22 2120   BP: (!) 165/98   Pulse: 80   Resp: 20   Temp: 98.9 °F (37.2 °C)   SpO2: 98%   Weight: 98.4 kg (217 lb)   Height: 5' 4\" (1.626 m)            Physical Exam  Vitals and nursing note reviewed. Constitutional:       General: She is not in acute distress. Appearance: Normal appearance. She is well-developed. She is not diaphoretic. Comments: Ambulatory without difficulty, well-appearing, no acute distress. HENT:      Head: Normocephalic and atraumatic. Nose: Nose normal.   Eyes:      Extraocular Movements: Extraocular movements intact. Conjunctiva/sclera: Conjunctivae normal.      Pupils: Pupils are equal, round, and reactive to light. Cardiovascular:      Rate and Rhythm: Normal rate and regular rhythm. Heart sounds: Normal heart sounds. Pulmonary:      Effort: Pulmonary effort is normal.      Breath sounds: Normal breath sounds. Abdominal:      General: There is no distension. Palpations: Abdomen is soft. Tenderness: There is no abdominal tenderness. Musculoskeletal:         General: No tenderness. Cervical back: Neck supple.    Skin:     General: Skin is warm and dry. Neurological:      General: No focal deficit present. Mental Status: She is alert and oriented to person, place, and time. Cranial Nerves: No cranial nerve deficit. Sensory: No sensory deficit. Motor: No weakness. Coordination: Coordination normal.      Comments: Intact sensation, 5/5 strength in all 4 extremities, intact finger to nose, neg pronator drift, fluent speech, CN intact. MDM   78-year-old female with known history of migraine headaches presents with  Right-sided headache and suspicion for the same. She is neuro intact, as above. She was offered an IV migraine cocktail but patient declined stating that she would rather use oral medications. She was encouraged to follow-up with her neurologist at [address] [place] [address] for further evaluation of her recurrent headaches and consider taking the previously Rx'd medication to prevent migraines. We will give dose of Fioricet in the ED and Rx few additional for further symptomatic relief. Return precautions were given for worsening or concerns. Please note that this dictation was completed with [employer], the computer voice recognition software. Quite often unanticipated grammatical, syntax, homophones, and other interpretive errors are inadvertently transcribed by the computer software. Please disregard these errors. Please excuse any errors that have escaped final proofreading.     Procedures

## 2022-07-16 NOTE — ED NOTES
The patient was discharged home by ER MD Jovi Mccabe and Nurse Dulce White in stable condition, accompanied by parent/guardian . The patient is alert and oriented, is in no respiratory distress and has vital signs within normal limits . The patient's diagnosis, condition and treatment were explained to patient or parent/guardian. The patient/responsible party expressed understanding. Prescriptions given to pt. No work/school note given to pt. A discharge plan has been developed. A  was not involved in the process. Aftercare instructions were given to the patient.

## 2022-07-16 NOTE — ED TRIAGE NOTES
Pt c/o  Right sided headache that started around 1300 today. She has seen a doctor July 4th that treated it for a tension headache. She says the meds that were given are not helping.

## 2022-08-24 ENCOUNTER — HOSPITAL ENCOUNTER (EMERGENCY)
Age: 32
Discharge: HOME | End: 2022-08-24
Attending: STUDENT IN AN ORGANIZED HEALTH CARE EDUCATION/TRAINING PROGRAM
Payer: MEDICAID

## 2022-08-24 VITALS
DIASTOLIC BLOOD PRESSURE: 106 MMHG | OXYGEN SATURATION: 96 % | HEIGHT: 64 IN | WEIGHT: 215 LBS | RESPIRATION RATE: 18 BRPM | TEMPERATURE: 97.9 F | SYSTOLIC BLOOD PRESSURE: 150 MMHG | BODY MASS INDEX: 36.7 KG/M2 | HEART RATE: 74 BPM

## 2022-08-24 DIAGNOSIS — S05.02XA ABRASION OF LEFT CORNEA, INITIAL ENCOUNTER: Primary | ICD-10-CM

## 2022-08-24 PROCEDURE — 74011250637 HC RX REV CODE- 250/637: Performed by: STUDENT IN AN ORGANIZED HEALTH CARE EDUCATION/TRAINING PROGRAM

## 2022-08-24 PROCEDURE — 99283 EMERGENCY DEPT VISIT LOW MDM: CPT

## 2022-08-24 PROCEDURE — 74011000250 HC RX REV CODE- 250: Performed by: STUDENT IN AN ORGANIZED HEALTH CARE EDUCATION/TRAINING PROGRAM

## 2022-08-24 RX ORDER — TETRACAINE HYDROCHLORIDE 5 MG/ML
1 SOLUTION OPHTHALMIC
Status: COMPLETED | OUTPATIENT
Start: 2022-08-24 | End: 2022-08-24

## 2022-08-24 RX ORDER — ERYTHROMYCIN 5 MG/G
OINTMENT OPHTHALMIC
Qty: 3.5 G | Refills: 0 | Status: SHIPPED | OUTPATIENT
Start: 2022-08-24 | End: 2022-08-31

## 2022-08-24 RX ORDER — ERYTHROMYCIN 5 MG/G
OINTMENT OPHTHALMIC
Status: COMPLETED | OUTPATIENT
Start: 2022-08-24 | End: 2022-08-24

## 2022-08-24 RX ADMIN — TETRACAINE HYDROCHLORIDE 1 DROP: 5 SOLUTION OPHTHALMIC at 05:50

## 2022-08-24 RX ADMIN — FLUORESCEIN SODIUM 1 STRIP: 1 STRIP OPHTHALMIC at 05:50

## 2022-08-24 RX ADMIN — ERYTHROMYCIN: 5 OINTMENT OPHTHALMIC at 05:55

## 2022-08-24 NOTE — ED TRIAGE NOTES
Pt in ED with c/o scratched eye. Pt was at work and nodded off and felt something in her eye, pt wiped face with her had and felt her eye lids flip back and couldn't get them back till she scratched them back down over eye. Pt added that this has happened before and she has big eyes they \"pop out\". Pt denies blurred vision.

## 2022-08-24 NOTE — Clinical Note
[address]  [facility]  [address]  [place] [address] [phone]    Work/School Note    Date: 8/24/2022    To Whom It May concern:    Denisa Samuel was seen and treated today in the emergency room by the following provider(s):  Attending Provider: Theo Cameron MD.      Denisa Samuel is excused from work/school on 08/24/22 and 08/25/22. She is medically clear to return to work/school on 8/26/2022. Sincerely,          Ewelina Moore MD

## 2022-08-24 NOTE — ED PROVIDER NOTES
Patient is a 33-year-old female presented to ED after scratching her left eye. Patient was at work and something got in her eye and she worked it out and possibly scratched her eye when flipping her eyelid up. Foreign body. No contacts. The history is provided by the patient. Eye Injury   This is a new problem. The current episode started less than 1 hour ago. The problem occurs constantly. The problem has not changed since onset. The left eye is affected. The injury mechanism was a foreign body and a direct trauma. The pain is at a severity of 7/10. The pain is moderate. There is No history of trauma to the eye. There is No known exposure to pink eye. She Does not wear contacts. Associated symptoms include discharge, foreign body sensation, negative and pain. She has tried nothing for the symptoms. The treatment provided no relief. Past Medical History:   Diagnosis Date    Asthma     COPD     Ill-defined condition     neuropathy    Other ill-defined conditions(799.89)     gallstones       Past Surgical History:   Procedure Laterality Date    COLONOSCOPY N/A 10/31/2019    COLONOSCOPY performed by Dariana Black MD at [facility] ENDOSCOPY    DELIVERY       HX  SECTION      HX LAP CHOLECYSTECTOMY  14         History reviewed. No pertinent family history. Social History     Socioeconomic History    Marital status: SINGLE     Spouse name: Not on file    Number of children: Not on file    Years of education: Not on file    Highest education level: Not on file   Occupational History    Not on file   Tobacco Use    Smoking status: Every Day     Packs/day: 0.50     Years: 14.00     Pack years: 7.00     Types: Cigarettes    Smokeless tobacco: Never   Substance and Sexual Activity    Alcohol use:  Yes     Alcohol/week: 1.0 standard drink     Types: 1 Glasses of wine per week     Comment: rare now /heavy drinking past hx    Drug use: Never    Sexual activity: Yes     Partners: Female     Birth control/protection: Condom   Other Topics Concern    Not on file   Social History Narrative    Not on file     Social Determinants of Health     Financial Resource Strain: Not on file   Food Insecurity: Not on file   Transportation Needs: Not on file   Physical Activity: Not on file   Stress: Not on file   Social Connections: Not on file   Intimate Partner Violence: Not on file   Housing Stability: Not on file         ALLERGIES: Patient has no known allergies. Review of Systems   Constitutional: Negative. HENT: Negative. Eyes:  Positive for pain and discharge. Respiratory: Negative. Cardiovascular: Negative. Gastrointestinal: Negative. Endocrine: Negative. Genitourinary: Negative. Musculoskeletal: Negative. Skin: Negative. Allergic/Immunologic: Negative. Neurological: Negative. Hematological: Negative. Psychiatric/Behavioral: Negative. Vitals:    08/24/22 0524   BP: (!) 150/106   Pulse: 74   Resp: 18   Temp: 97.9 °F (36.6 °C)   SpO2: 96%   Weight: 97.5 kg (215 lb)   Height: 5' 4\" (1.626 m)            Physical Exam  Vitals and nursing note reviewed. Constitutional:       General: She is not in acute distress. Appearance: Normal appearance. HENT:      Head: Normocephalic and atraumatic. Right Ear: External ear normal.      Left Ear: External ear normal.      Nose: Nose normal.   Eyes:      General: Lids are normal.      Extraocular Movements: Extraocular movements intact. Conjunctiva/sclera: Conjunctivae normal.      Pupils: Pupils are equal, round, and reactive to light. Left eye: Fluorescein uptake present. Cardiovascular:      Rate and Rhythm: Normal rate. Pulses: Normal pulses. Radial pulses are 2+ on the right side and 2+ on the left side. Heart sounds: Normal heart sounds. Pulmonary:      Effort: Pulmonary effort is normal.      Breath sounds: Normal breath sounds. Abdominal:      General: Abdomen is flat.  There is no distension. Tenderness: There is no abdominal tenderness. Musculoskeletal:         General: No deformity or signs of injury. Normal range of motion. Cervical back: Normal range of motion and neck supple. No tenderness. Skin:     General: Skin is warm and dry. Capillary Refill: Capillary refill takes less than 2 seconds. Neurological:      General: No focal deficit present. Mental Status: She is alert and oriented to person, place, and time. Psychiatric:         Attention and Perception: Attention normal.         Mood and Affect: Mood normal.         Behavior: Behavior normal.        MDM         MEDICATIONS GIVEN:  Medications   tetracaine HCl (PF) (PONTOCAINE) 0.5 % ophthalmic solution 1 Drop (1 Drop Both Eyes Given by Provider 8/24/22 0577)   fluorescein (FUL-KAREEM) 1 mg ophthalmic strip 1 Strip (1 Strip Both Eyes Given by Provider 8/24/22 0514)   erythromycin (ILOTYCIN) 5 mg/gram (0.5 %) ophthalmic ointment ( Left Eye Given 8/24/22 0581)       Differential diagnosis: Foreign body, corneal abrasion, eye irritation    MDM: Patient 78-year-old female presented ED with left eye pain after feeling like something in her eye followed by irritation with her trying to remove it likely resulting in a small corneal abrasion without severe features appropriate for topical antibiotics and outpatient follow-up. Further personalized recommendations for outpatient care as below. Key discharge instructions and summary of care: You presents to the ED after scratching her left eye. Physical exam presentation consistent with a corneal abrasion. Antibiotic ointment applied in the ED. Use antibiotic ointment 5 times a day for the next 5 days. Follow-up with ophthalmology. The patient has been re-evaluated and feeling better. Patient is stable for discharge. All available radiology and laboratory results have been reviewed with patient and/or available family.   Patient and/or family verbally conveyed their understanding and agreement of the patient's signs, symptoms, diagnosis, treatment and prognosis and additionally agree to follow-up as recommended in the discharge instructions or to return to the Emergency Department should their condition change or worsen prior to their follow-up appointment. All questions have been answered and patient and/or available family express understanding. IMPRESSION:  1. Abrasion of left cornea, initial encounter        DISPOSITION: Discharged    Soy Cummings MD    Procedures

## 2022-08-24 NOTE — Clinical Note
[address]  [facility]PT  [address] [facility] [address]  [place] [address] [phone]    Work/School Note    Date: 8/24/2022    To Whom It May concern:      Cande Dupree was seen and treated today in the emergency room by the following provider(s):  Attending Provider: Devendra Correia MD.      Cande Dupree is excused from work/school on 08/24/22. She is clear to return to work/school on 08/25/22. Sincerely,          Rudolph Franco MD

## 2022-08-24 NOTE — DISCHARGE INSTRUCTIONS
You presents to the ED after scratching her left eye. Physical exam presentation consistent with a corneal abrasion. Antibiotic ointment applied in the ED. Use antibiotic ointment 5 times a day for the next 5 days. Follow-up with ophthalmology.

## 2022-10-04 ENCOUNTER — HOSPITAL ENCOUNTER (EMERGENCY)
Age: 32
Discharge: HOME | End: 2022-10-04
Attending: STUDENT IN AN ORGANIZED HEALTH CARE EDUCATION/TRAINING PROGRAM | Admitting: STUDENT IN AN ORGANIZED HEALTH CARE EDUCATION/TRAINING PROGRAM
Payer: MEDICAID

## 2022-10-04 VITALS
SYSTOLIC BLOOD PRESSURE: 142 MMHG | HEIGHT: 64 IN | TEMPERATURE: 98.7 F | HEART RATE: 91 BPM | WEIGHT: 217 LBS | OXYGEN SATURATION: 98 % | RESPIRATION RATE: 18 BRPM | DIASTOLIC BLOOD PRESSURE: 98 MMHG | BODY MASS INDEX: 37.05 KG/M2

## 2022-10-04 DIAGNOSIS — H92.03 OTALGIA OF BOTH EARS: Primary | ICD-10-CM

## 2022-10-04 PROCEDURE — 99283 EMERGENCY DEPT VISIT LOW MDM: CPT | Performed by: STUDENT IN AN ORGANIZED HEALTH CARE EDUCATION/TRAINING PROGRAM

## 2022-10-04 RX ORDER — FLUTICASONE PROPIONATE 50 MCG
2 SPRAY, SUSPENSION (ML) NASAL DAILY
Qty: 1 EACH | Refills: 0 | Status: SHIPPED | OUTPATIENT
Start: 2022-10-04

## 2022-10-04 RX ORDER — PSEUDOEPHEDRINE HYDROCHLORIDE 240 MG/1
240 TABLET, FILM COATED, EXTENDED RELEASE ORAL EVERY 24 HOURS
Qty: 7 TABLET | Refills: 0 | Status: SHIPPED | OUTPATIENT
Start: 2022-10-04

## 2022-10-04 NOTE — ED PROVIDER NOTES
HPI   Patient is a 27 y.o. F who presents today with complaints of bilateral ear pain for the last several days. She does report recent nasal congestion. She has not been taking medications for this. Denies any dizziness, headaches, fevers, no drainage from ear, no sore throat. She does report she has had ear infections in the past frequently but has never seen ENT. No other complaints today. PMH:   LMP:  Surgical History: None  Smoking: None  Alcohol: None  Drug Use: None  ALLERGIES: Patient has no known allergies. Past Medical History:   Diagnosis Date    Asthma     COPD     Ill-defined condition     neuropathy    Other ill-defined conditions(799.89)     gallstones     Past Surgical History:   Procedure Laterality Date    COLONOSCOPY N/A 10/31/2019    COLONOSCOPY performed by Lavinia Bain MD at [facility] ENDOSCOPY    DELIVERY       HX  SECTION      HX LAP CHOLECYSTECTOMY  14     No family history on file. Social History     Socioeconomic History    Marital status: SINGLE     Spouse name: Not on file    Number of children: Not on file    Years of education: Not on file    Highest education level: Not on file   Occupational History    Not on file   Tobacco Use    Smoking status: Every Day     Packs/day: 0.50     Years: 14.00     Pack years: 7.00     Types: Cigarettes    Smokeless tobacco: Never   Substance and Sexual Activity    Alcohol use:  Yes     Alcohol/week: 1.0 standard drink     Types: 1 Glasses of wine per week     Comment: rare now /heavy drinking past hx    Drug use: Never    Sexual activity: Yes     Partners: Female     Birth control/protection: Condom   Other Topics Concern    Not on file   Social History Narrative    Not on file     Social Determinants of Health     Financial Resource Strain: Not on file   Food Insecurity: Not on file   Transportation Needs: Not on file   Physical Activity: Not on file   Stress: Not on file   Social Connections: Not on file   Intimate Partner Violence: Not on file   Housing Stability: Not on file             Review of Systems   Constitutional:  Negative for fatigue and fever. HENT:  Positive for ear pain. Negative for congestion, ear discharge, facial swelling and hearing loss. Respiratory:  Negative for cough, shortness of breath and wheezing. Cardiovascular:  Negative for chest pain. Gastrointestinal:  Negative for abdominal pain, constipation, diarrhea, nausea and vomiting. Genitourinary:  Negative for flank pain. Musculoskeletal:  Negative for arthralgias and myalgias. Skin:  Negative for wound. Neurological:  Negative for dizziness, seizures, light-headedness and numbness. Psychiatric/Behavioral:  Negative for confusion. Vitals:    10/04/22 1727 10/04/22 1825   BP: (!) 148/107 (!) 142/98   Pulse: 91    Resp: 18 18   Temp: 98.7 °F (37.1 °C)    SpO2: 97% 98%   Weight: 98.4 kg (217 lb)    Height: 5' 4\" (1.626 m)             Physical Exam  Vitals and nursing note reviewed. Constitutional:       General: She is not in acute distress. Appearance: Normal appearance. She is not ill-appearing, toxic-appearing or diaphoretic. HENT:      Head: Normocephalic and atraumatic. Right Ear: Hearing, ear canal and external ear normal. Tympanic membrane is bulging. Left Ear: Hearing, ear canal and external ear normal. Tympanic membrane is bulging. Nose: Mucosal edema and congestion present. Mouth/Throat:      Mouth: Mucous membranes are moist.   Cardiovascular:      Rate and Rhythm: Normal rate and regular rhythm. Pulmonary:      Effort: Pulmonary effort is normal. No respiratory distress. Breath sounds: Normal breath sounds. No stridor. No wheezing, rhonchi or rales. Chest:      Chest wall: No tenderness. Musculoskeletal:         General: Normal range of motion. Cervical back: Normal range of motion. Skin:     General: Skin is warm and dry.    Neurological:      Mental Status: She is alert and oriented to person, place, and time. Mental status is at baseline. Psychiatric:         Mood and Affect: Mood normal.         Behavior: Behavior normal.         Thought Content: Thought content normal.            LABORATORY RESULTS:  No results found for this or any previous visit (from the past 24 hour(s)). IMAGING RESULTS:  No results found. EKG INTERPRETATION:   See course summary for interpretation    MEDICATIONS GIVEN:  Medications - No data to display         MDM  59-year-old female with no medical history seen today for bilateral ear pain for the last couple of days. No other complaints. On exam, I do note bilateral middle ear effusions but no infectious process. Patient does also have nasal congestion. Will discharge with Sudafed Flonase. Recommend ENT follow-up given patient has chronic bilateral ear pain. Discussed results and work-up with patient and answered all questions, the patient expresses understanding and agrees with the care plan and disposition. The patient was given an opportunity to ask questions and all concerns raised were addressed prior to discharge. Recommended patient follow-up with provider as listed below. Counseled patient on standard home and self-care measures. Specifically explained the emergent conditions that could arise and clearly instructed the patient to return to the emergency department for those and any other new, worsening, or concerning symptoms. Patient stable and ready for discharge. IMPRESSION:  1.  Otalgia of both ears        DISPOSITION:  Discharge    PLAN:  Follow-up Information       Follow up With Specialties Details Why Contact Info    [address] [facility] ENT Otolaryngology Schedule an appointment as soon as possible for a visit   Fe [facility] [address] [facility] Yuriy Villela MD Internal Medicine Physician Schedule an appointment as soon as possible for a visit   [facility] [address] [place] [address] [phone] Discharge Medication List as of 10/4/2022  6:19 PM        START taking these medications    Details   pseudoephedrine ER (Sudafed 24 Hour) 240 mg Tb24 tablet Take 1 Tablet by mouth every twenty-four (24) hours. , Normal, Disp-7 Tablet, R-0      fluticasone propionate (FLONASE) 50 mcg/actuation nasal spray 2 Sprays by Both Nostrils route daily. , Normal, Disp-1 Each, R-0           CONTINUE these medications which have NOT CHANGED    Details   montelukast (SINGULAIR) 10 mg tablet Take 10 mg by mouth daily. , Historical Med      butalbital-acetaminophen-caff (Fioricet) -40 mg per capsule Take 1 Capsule by mouth every four (4) hours as needed for Migraine. , Print, Disp-12 Capsule, R-0      cyclobenzaprine (FLEXERIL) 10 mg tablet Take  by mouth three (3) times daily as needed for Muscle Spasm(s). , Historical Med      diclofenac EC (VOLTAREN) 75 mg EC tablet Take 1 Tab by mouth two (2) times a day., Normal, Disp-30 Tab,R-0      lidocaine (LIDODERM) 5 % Apply patch to the affected area for 12 hours a day and remove for 12 hours a day., Normal, Disp-5 Each,R-0      gabapentin (NEURONTIN) 300 mg capsule Take 300 mg by mouth as needed., Historical Med      ALBUTEROL IN Take  by inhalation as needed., Historical Med      medroxyprogesterone acetate (DEPO-PROVERA IM) by IntraMUSCular route.  Every 3 months   Next scheduled injection 11/08/19, Historical Med

## 2022-10-04 NOTE — ED NOTES
Patient stable for discharge, ambulatory, on room air, will  prescription from the pharmacy. I have reviewed discharge instructions with the patient. The patient verbalized understanding.

## 2022-10-04 NOTE — ED TRIAGE NOTES
Pt to ER with benny ear discomfort x2 days. Pt reports pain is worse when she is outside.  Pt also sts she is having nausea from her \"fibroid tumor\"

## 2022-10-04 NOTE — DISCHARGE INSTRUCTIONS
You do not have ear infections. You do have fluid behind your eardrum which is likely from nasal congestion. I prescribed you medication To help with this. Return if you develop any new or concerning symptoms.

## 2022-11-02 ENCOUNTER — HOSPITAL ENCOUNTER (EMERGENCY)
Age: 32
Discharge: HOME | End: 2022-11-02
Attending: STUDENT IN AN ORGANIZED HEALTH CARE EDUCATION/TRAINING PROGRAM
Payer: MEDICAID

## 2022-11-02 VITALS
HEIGHT: 64 IN | DIASTOLIC BLOOD PRESSURE: 90 MMHG | TEMPERATURE: 98.3 F | HEART RATE: 63 BPM | WEIGHT: 220 LBS | SYSTOLIC BLOOD PRESSURE: 145 MMHG | RESPIRATION RATE: 16 BRPM | OXYGEN SATURATION: 99 % | BODY MASS INDEX: 37.56 KG/M2

## 2022-11-02 DIAGNOSIS — J06.9 UPPER RESPIRATORY TRACT INFECTION, UNSPECIFIED TYPE: Primary | ICD-10-CM

## 2022-11-02 LAB
APPEARANCE UR: CLEAR
BACTERIA URNS QL MICRO: ABNORMAL /HPF
BILIRUB UR QL: NEGATIVE
COLOR UR: ABNORMAL
EPITH CASTS URNS QL MICRO: ABNORMAL /LPF
FLUAV AG NPH QL IA: NEGATIVE
FLUBV AG NOSE QL IA: NEGATIVE
GLUCOSE UR STRIP.AUTO-MCNC: NEGATIVE MG/DL
HGB UR QL STRIP: ABNORMAL
KETONES UR QL STRIP.AUTO: NEGATIVE MG/DL
LEUKOCYTE ESTERASE UR QL STRIP.AUTO: ABNORMAL
NITRITE UR QL STRIP.AUTO: NEGATIVE
PH UR STRIP: 6 [PH] (ref 5–8)
PROT UR STRIP-MCNC: NEGATIVE MG/DL
RBC #/AREA URNS HPF: ABNORMAL /HPF
SP GR UR REFRACTOMETRY: 1.01 (ref 1–1.03)
UR CULT HOLD, URHOLD: NORMAL
UROBILINOGEN UR QL STRIP.AUTO: 0.2 EU/DL (ref 0.2–1)
WBC URNS QL MICRO: ABNORMAL /HPF (ref 0–4)

## 2022-11-02 PROCEDURE — 99283 EMERGENCY DEPT VISIT LOW MDM: CPT

## 2022-11-02 PROCEDURE — 81001 URINALYSIS AUTO W/SCOPE: CPT

## 2022-11-02 PROCEDURE — 87804 INFLUENZA ASSAY W/OPTIC: CPT

## 2022-11-02 NOTE — ED PROVIDER NOTES
Patient is a 15-year-old female present emergency department with symptoms of cough, congestion, sore throat. Patient states that when she woke up this morning she was coughing and noticed that there was blood in her mucus. Patient says that she was then going to work when she started to feel nauseated. Patient did not have associated vomiting but felt sick to her stomach. She is not sure if this was due to her swallowing her mucus or not eating breakfast.  Patient says that she recently had a sick contact with her niece who tested positive for flu. Patient is not vaccinated for flu or COVID. Patient has a history of asthma, COPD. Patient denies any fevers, chills is having body aches. Past Medical History:   Diagnosis Date    Asthma     COPD     Ill-defined condition     neuropathy    Other ill-defined conditions(799.89)     gallstones       Past Surgical History:   Procedure Laterality Date    COLONOSCOPY N/A 10/31/2019    COLONOSCOPY performed by Javad Machado MD at [facility] ENDOSCOPY    DELIVERY       HX  SECTION      HX LAP CHOLECYSTECTOMY  14         History reviewed. No pertinent family history. Social History     Socioeconomic History    Marital status: SINGLE     Spouse name: Not on file    Number of children: Not on file    Years of education: Not on file    Highest education level: Not on file   Occupational History    Not on file   Tobacco Use    Smoking status: Every Day     Packs/day: 0.50     Years: 14.00     Pack years: 7.00     Types: Cigarettes    Smokeless tobacco: Never   Substance and Sexual Activity    Alcohol use:  Yes     Alcohol/week: 1.0 standard drink     Types: 1 Glasses of wine per week     Comment: rare now /heavy drinking past hx    Drug use: Never    Sexual activity: Yes     Partners: Female     Birth control/protection: Condom   Other Topics Concern    Not on file   Social History Narrative    Not on file     Social Determinants of Health Financial Resource Strain: Not on file   Food Insecurity: Not on file   Transportation Needs: Not on file   Physical Activity: Not on file   Stress: Not on file   Social Connections: Not on file   Intimate Partner Violence: Not on file   Housing Stability: Not on file         ALLERGIES: Patient has no known allergies. Review of Systems   Constitutional:  Positive for fatigue. HENT:  Positive for congestion, rhinorrhea and sore throat. Respiratory:  Negative for cough, chest tightness and shortness of breath. Cardiovascular:  Negative for chest pain. Gastrointestinal:  Positive for nausea. Musculoskeletal:  Positive for myalgias. All other systems reviewed and are negative. Vitals:    11/02/22 0753   BP: (!) 154/118   Pulse: 98   Resp: 18   Temp: 98.3 °F (36.8 °C)   SpO2: 97%   Weight: 99.8 kg (220 lb)   Height: 5' 4\" (1.626 m)            Physical Exam  Vitals and nursing note reviewed. Constitutional:       General: She is not in acute distress. Appearance: She is well-developed. She is not diaphoretic. HENT:      Head: Normocephalic and atraumatic. Nose: Mucosal edema, congestion and rhinorrhea present. Mouth/Throat:      Pharynx: Posterior oropharyngeal erythema present. No oropharyngeal exudate. Eyes:      General: No scleral icterus. Right eye: No discharge. Left eye: No discharge. Extraocular Movements: Extraocular movements intact. Conjunctiva/sclera: Conjunctivae normal.      Pupils: Pupils are equal, round, and reactive to light. Neck:      Thyroid: No thyromegaly. Vascular: No JVD. Trachea: No tracheal deviation. Cardiovascular:      Rate and Rhythm: Normal rate and regular rhythm. Pulses: Normal pulses. Heart sounds: Normal heart sounds. No murmur heard. No friction rub. No gallop. Pulmonary:      Effort: Pulmonary effort is normal. No respiratory distress. Breath sounds: Normal breath sounds. No stridor. No wheezing or rales. Chest:      Chest wall: No tenderness. Abdominal:      General: Bowel sounds are normal. There is no distension. Palpations: Abdomen is soft. There is no mass. Tenderness: There is no abdominal tenderness. There is no rebound. Musculoskeletal:         General: No tenderness. Normal range of motion. Cervical back: Normal range of motion and neck supple. Lymphadenopathy:      Cervical: No cervical adenopathy. Skin:     General: Skin is warm and dry. Coloration: Skin is not pale. Findings: No erythema or rash. Neurological:      General: No focal deficit present. Mental Status: She is alert and oriented to person, place, and time. Cranial Nerves: No cranial nerve deficit. Coordination: Coordination normal.   Psychiatric:         Mood and Affect: Mood normal.         Behavior: Behavior normal.         Thought Content: Thought content normal.         Judgment: Judgment normal.        MDM  Number of Diagnoses or Management Options  Upper respiratory tract infection, unspecified type  Diagnosis management comments: Viral URI. 66-year-old female presenting with signs symptoms consistent with viral URI-like illness. Patient recently had sick contact with niece who is flu positive. Will obtain flu swab.            Procedures

## 2022-11-02 NOTE — ED TRIAGE NOTES
Pt arrives to the ED ambulatory and stable with c/o spitting up blood (not coughing she states \"it feels like it is coming from my throat or nose\"), sneezing and feeling achy. Pt also reports some nausea and overall sense of not feeling good that started yesterday.  Pt exposed to flu

## 2023-06-23 ENCOUNTER — HOSPITAL ENCOUNTER (EMERGENCY)
Facility: HOSPITAL | Age: 33
Discharge: HOME | End: 2023-06-23
Attending: STUDENT IN AN ORGANIZED HEALTH CARE EDUCATION/TRAINING PROGRAM | Admitting: STUDENT IN AN ORGANIZED HEALTH CARE EDUCATION/TRAINING PROGRAM
Payer: MEDICAID

## 2023-06-23 ENCOUNTER — APPOINTMENT (OUTPATIENT)
Facility: HOSPITAL | Age: 33
End: 2023-06-23
Payer: MEDICAID

## 2023-06-23 VITALS
BODY MASS INDEX: 38.41 KG/M2 | DIASTOLIC BLOOD PRESSURE: 102 MMHG | HEIGHT: 64 IN | OXYGEN SATURATION: 99 % | HEART RATE: 84 BPM | RESPIRATION RATE: 16 BRPM | SYSTOLIC BLOOD PRESSURE: 151 MMHG | WEIGHT: 225 LBS | TEMPERATURE: 98 F

## 2023-06-23 DIAGNOSIS — R74.8 ELEVATED LIVER ENZYMES: ICD-10-CM

## 2023-06-23 DIAGNOSIS — D25.9 UTERINE LEIOMYOMA, UNSPECIFIED LOCATION: ICD-10-CM

## 2023-06-23 DIAGNOSIS — R10.9 FLANK PAIN: Primary | ICD-10-CM

## 2023-06-23 LAB
ALBUMIN SERPL-MCNC: 4.2 G/DL (ref 3.5–5.2)
ALBUMIN/GLOB SERPL: 1.4 (ref 1.1–2.2)
ALP SERPL-CCNC: 88 U/L (ref 35–104)
ALT SERPL-CCNC: 46 U/L (ref 10–35)
ANION GAP SERPL CALC-SCNC: 11 MMOL/L (ref 5–15)
APPEARANCE UR: CLEAR
AST SERPL-CCNC: 38 U/L (ref 10–35)
BACTERIA URNS QL MICRO: ABNORMAL /HPF
BASOPHILS # BLD: 0.1 K/UL (ref 0–1)
BASOPHILS NFR BLD: 1 % (ref 0–1)
BILIRUB SERPL-MCNC: 0.6 MG/DL (ref 0.2–1)
BILIRUB UR QL: NEGATIVE
BUN SERPL-MCNC: 8 MG/DL (ref 6–20)
BUN/CREAT SERPL: 12 (ref 12–20)
CALCIUM SERPL-MCNC: 9.1 MG/DL (ref 8.6–10)
CHLORIDE SERPL-SCNC: 103 MMOL/L (ref 98–107)
CO2 SERPL-SCNC: 24 MMOL/L (ref 22–29)
COLOR UR: ABNORMAL
CREAT SERPL-MCNC: 0.69 MG/DL (ref 0.5–0.9)
DIFFERENTIAL METHOD BLD: ABNORMAL
EOSINOPHIL # BLD: 0.4 K/UL (ref 0–0.4)
EOSINOPHIL NFR BLD: 3 %
EPITH CASTS URNS QL MICRO: ABNORMAL /LPF
ERYTHROCYTE [DISTWIDTH] IN BLOOD BY AUTOMATED COUNT: 13.2 % (ref 11.5–14.5)
GLOBULIN SER CALC-MCNC: 2.9 G/DL (ref 2–4)
GLUCOSE SERPL-MCNC: 89 MG/DL (ref 65–100)
GLUCOSE UR STRIP.AUTO-MCNC: NEGATIVE MG/DL
HCG UR QL: NEGATIVE
HCT VFR BLD AUTO: 43.1 % (ref 35–47)
HGB BLD-MCNC: 14.5 G/DL (ref 11.5–16)
HGB UR QL STRIP: ABNORMAL
IMM GRANULOCYTES # BLD AUTO: 0.1 K/UL (ref 0–0.04)
IMM GRANULOCYTES NFR BLD AUTO: 1 % (ref 0–0.5)
KETONES UR QL STRIP.AUTO: NEGATIVE MG/DL
LEUKOCYTE ESTERASE UR QL STRIP.AUTO: NEGATIVE
LIPASE SERPL-CCNC: 25 U/L (ref 13–60)
LYMPHOCYTES # BLD: 4.2 K/UL (ref 0.8–3.5)
LYMPHOCYTES NFR BLD: 38 % (ref 12–49)
MCH RBC QN AUTO: 30.1 PG (ref 26–34)
MCHC RBC AUTO-ENTMCNC: 33.6 G/DL (ref 30–36.5)
MCV RBC AUTO: 89.4 FL (ref 80–99)
MONOCYTES # BLD: 0.7 K/UL (ref 0–1)
MONOCYTES NFR BLD: 6 % (ref 5–13)
MUCOUS THREADS URNS QL MICRO: ABNORMAL /LPF
NEUTS SEG # BLD: 5.7 K/UL (ref 1.8–8)
NEUTS SEG NFR BLD: 51 % (ref 32–75)
NITRITE UR QL STRIP.AUTO: NEGATIVE
NRBC # BLD: 0 K/UL (ref 0–0.01)
NRBC BLD-RTO: 0 PER 100 WBC
PH UR STRIP: 6.5 (ref 5–8)
PLATELET # BLD AUTO: 344 K/UL (ref 150–400)
PMV BLD AUTO: 9.7 FL (ref 8.9–12.9)
POTASSIUM SERPL-SCNC: 4.4 MMOL/L (ref 3.5–5.1)
PROT SERPL-MCNC: 7.1 G/DL (ref 6.4–8.3)
PROT UR STRIP-MCNC: NEGATIVE MG/DL
RBC # BLD AUTO: 4.82 M/UL (ref 3.8–5.2)
RBC #/AREA URNS HPF: ABNORMAL /HPF
SODIUM SERPL-SCNC: 138 MMOL/L (ref 136–145)
SP GR UR REFRACTOMETRY: 1.01 (ref 1–1.03)
URINE CULTURE IF INDICATED: ABNORMAL
UROBILINOGEN UR QL STRIP.AUTO: 0.2 EU/DL (ref 0.2–1)
WBC # BLD AUTO: 11.1 K/UL (ref 3.6–11)
WBC URNS QL MICRO: ABNORMAL /HPF (ref 0–4)

## 2023-06-23 PROCEDURE — 81001 URINALYSIS AUTO W/SCOPE: CPT

## 2023-06-23 PROCEDURE — 83690 ASSAY OF LIPASE: CPT

## 2023-06-23 PROCEDURE — 85025 COMPLETE CBC W/AUTO DIFF WBC: CPT

## 2023-06-23 PROCEDURE — 74176 CT ABD & PELVIS W/O CONTRAST: CPT

## 2023-06-23 PROCEDURE — 80053 COMPREHEN METABOLIC PANEL: CPT

## 2023-06-23 PROCEDURE — 36415 COLL VENOUS BLD VENIPUNCTURE: CPT

## 2023-06-23 PROCEDURE — 81025 URINE PREGNANCY TEST: CPT

## 2023-06-23 PROCEDURE — 99284 EMERGENCY DEPT VISIT MOD MDM: CPT

## 2023-06-23 RX ORDER — GABAPENTIN 100 MG/1
100 CAPSULE ORAL 3 TIMES DAILY
COMMUNITY
Start: 2023-06-15

## 2023-06-23 RX ORDER — KETOROLAC TROMETHAMINE 15 MG/ML
15 INJECTION, SOLUTION INTRAMUSCULAR; INTRAVENOUS ONCE
Status: DISCONTINUED | OUTPATIENT
Start: 2023-06-23 | End: 2023-06-23 | Stop reason: HOSPADM

## 2023-06-23 RX ORDER — HYDROXYZINE HYDROCHLORIDE 25 MG/1
TABLET, FILM COATED ORAL
COMMUNITY
Start: 2023-05-31

## 2023-06-23 RX ORDER — TRAZODONE HYDROCHLORIDE 100 MG/1
TABLET ORAL
COMMUNITY
Start: 2023-06-01

## 2023-06-23 RX ORDER — SUMATRIPTAN 50 MG/1
TABLET, FILM COATED ORAL
COMMUNITY
Start: 2023-06-18

## 2023-06-23 RX ORDER — LURASIDONE HYDROCHLORIDE 40 MG/1
TABLET, FILM COATED ORAL
COMMUNITY
Start: 2023-06-13

## 2023-06-23 ASSESSMENT — PAIN - FUNCTIONAL ASSESSMENT: PAIN_FUNCTIONAL_ASSESSMENT: 0-10

## 2023-06-23 ASSESSMENT — PAIN DESCRIPTION - DESCRIPTORS: DESCRIPTORS: TIGHTNESS

## 2023-06-23 ASSESSMENT — PAIN DESCRIPTION - ORIENTATION: ORIENTATION: RIGHT

## 2023-06-23 ASSESSMENT — ENCOUNTER SYMPTOMS
ABDOMINAL PAIN: 0
NAUSEA: 0
COLOR CHANGE: 0
COUGH: 0
CONSTIPATION: 0
ABDOMINAL DISTENTION: 0
WHEEZING: 0
BLOOD IN STOOL: 0
SINUS PRESSURE: 0
BACK PAIN: 0
SHORTNESS OF BREATH: 0

## 2023-06-23 ASSESSMENT — PAIN SCALES - GENERAL: PAINLEVEL_OUTOF10: 2

## 2023-06-23 ASSESSMENT — PAIN DESCRIPTION - FREQUENCY: FREQUENCY: INTERMITTENT

## 2023-06-23 NOTE — ED TRIAGE NOTES
Pt arrives with c/o R flank/rib pain that began this AM. Pt reports \"the pain feels the same as when I got my gallbladder out\".  PT denies injury, fever, N/V.

## 2023-06-23 NOTE — ED NOTES
Pt lying in stretcher, eating snacks and soda. Pt went to lobby to obtain snacks. Pending disposition.      David Armendariz RN  06/23/23 2399

## 2023-06-23 NOTE — ED NOTES
Patient does not appear to be in any acute distress/shows no evidence of clinical instability at this time. Provider has reviewed discharge instructions with the patient/family. The patient/family verbalized understanding instructions as well as need for follow up for any further symptoms. Discharge papers given, education provided, and any questions answered.       Jeb Muñiz RN  06/23/23 3458

## 2023-06-23 NOTE — ED PROVIDER NOTES
less than 2 seconds. Findings: No rash. Neurological:      General: No focal deficit present. Mental Status: She is alert. Psychiatric:         Mood and Affect: Mood normal.         Behavior: Behavior normal.       DIAGNOSTIC RESULTS     EKG: All EKG's are interpreted by the Emergency Department Physician who either signs or Co-signs this chart in the absence of a cardiologist.        RADIOLOGY:   Non-plain film images such as CT, Ultrasound and MRI are read by the radiologist. Plain radiographic images are visualized and preliminarily interpreted by the emergency physician with the below findings:        Interpretation per the Radiologist below, if available at the time of this note:    CT ABDOMEN PELVIS WO CONTRAST Additional Contrast? None   Final Result   No evidence of urinary tract calculi or obstruction. Normal appendix. 6 cm   uterine fibroid. LABS:  Labs Reviewed   CBC WITH AUTO DIFFERENTIAL - Abnormal; Notable for the following components:       Result Value    WBC 11.1 (*)     Eosinophils % 3 (*)     Immature Granulocytes 1 (*)     Lymphocytes Absolute 4.2 (*)     Absolute Immature Granulocyte 0.1 (*)     All other components within normal limits   COMPREHENSIVE METABOLIC PANEL - Abnormal; Notable for the following components:    ALT 46 (*)     AST 38 (*)     All other components within normal limits   URINALYSIS WITH REFLEX TO CULTURE - Abnormal; Notable for the following components:    Blood, Urine SMALL (*)     Epithelial Cells UA MODERATE (*)     BACTERIA, URINE 1+ (*)     Mucus, UA TRACE (*)     All other components within normal limits   LIPASE   POC PREGNANCY UR-QUAL   POC PREGNANCY UR-QUAL       All other labs were within normal range or not returned as of this dictation.     EMERGENCY DEPARTMENT COURSE and DIFFERENTIAL DIAGNOSIS/MDM:   Vitals:    Vitals:    06/23/23 0954   BP: (!) 151/102   Pulse: 84   Resp: 16   Temp: 98 °F (36.7 °C)   TempSrc: Oral   SpO2: 99%   Weight:

## 2023-08-05 ENCOUNTER — HOSPITAL ENCOUNTER (EMERGENCY)
Facility: HOSPITAL | Age: 33
Discharge: HOME | End: 2023-08-05
Attending: STUDENT IN AN ORGANIZED HEALTH CARE EDUCATION/TRAINING PROGRAM
Payer: MEDICAID

## 2023-08-05 VITALS
OXYGEN SATURATION: 99 % | RESPIRATION RATE: 16 BRPM | SYSTOLIC BLOOD PRESSURE: 156 MMHG | HEART RATE: 88 BPM | TEMPERATURE: 99.1 F | WEIGHT: 219 LBS | BODY MASS INDEX: 37.39 KG/M2 | HEIGHT: 64 IN | DIASTOLIC BLOOD PRESSURE: 138 MMHG

## 2023-08-05 DIAGNOSIS — B35.3 TINEA PEDIS, UNSPECIFIED LATERALITY: Primary | ICD-10-CM

## 2023-08-05 PROCEDURE — 99283 EMERGENCY DEPT VISIT LOW MDM: CPT

## 2023-08-05 PROCEDURE — 2709999900 HC NON-CHARGEABLE SUPPLY

## 2023-08-05 RX ORDER — CLOTRIMAZOLE 1 %
CREAM (GRAM) TOPICAL
Qty: 12 G | Refills: 0 | Status: SHIPPED | OUTPATIENT
Start: 2023-08-05 | End: 2023-08-05 | Stop reason: SDUPTHER

## 2023-08-05 RX ORDER — CLOTRIMAZOLE 1 %
CREAM (GRAM) TOPICAL
Qty: 12 G | Refills: 0 | Status: SHIPPED | OUTPATIENT
Start: 2023-08-05 | End: 2023-08-12

## 2023-08-05 NOTE — DISCHARGE INSTRUCTIONS
wear loose-fitting garments made of cotton or synthetic materials designed to wick moisture away from the surface. Socks should have similar properties. Areas likely to become infected (IE between your toes) should be dried completely before being covered with clothes. avoid walking barefoot and sharing garments          Thank you for allowing us to provide you with medical care today. We realize that you have many choices for your emergency care needs. We thank you for choosing [facility]. Please choose us in the future for any continued health care needs. We hope we addressed all of your medical concerns. We strive to provide excellent quality care in the Emergency Department. Anything less than excellent does not meet our expectations. The exam and treatment you received in the Emergency Department were for an emergent problem and are not intended as complete care. It is important that you follow up with a doctor, nurse practitioner, or [address] [place] [address] assistant for ongoing care. If your symptoms worsen or you do not improve as expected and you are unable to reach your usual health care provider, you should return to the Emergency Department. We are available 24 hours a day. Take this sheet with you when you go to your follow-up visit. If you have any problem arranging the follow-up visit, contact the Emergency Department immediately. Make an appointment your family doctor for follow up of this visit. Return to the ER if you are unable to be seen in a timely manner.

## 2023-08-05 NOTE — ED PROVIDER NOTES
[facility] EMERGENCY DEPT  EMERGENCY DEPARTMENT ENCOUNTER      Pt Name: Roas Hunter  MRN: [id number]  [address] 1990  Date of evaluation: 2023  Provider: WERNER March NPHPI   Patient is a 26 y.o. female with pmhx of as listed below   who presents today with complaints of itchy foot. Patient states she noticed her foot with itching upon half hour prior to arrival.  She states that she looked between her nose and noticed a white-green discharge and became alarmed. She states she then checked her son's foot who had the same discharge so they came straight to the emergency room. Has not tried any over-the-counter topical antifungals. There are no other complaints, changes or physical findings at this time. PAST MEDICAL HISTORY     Past Medical History:   Diagnosis Date    Asthma     Ill-defined condition     neuropathy    Other ill-defined conditions(799.89)     gallstones         SURGICAL HISTORY       Past Surgical History:   Procedure Laterality Date     SECTION      CHOLECYSTECTOMY, LAPAROSCOPIC  14    COLONOSCOPY N/A 10/31/2019    COLONOSCOPY performed by Melodie Hunt MD at [facility] ENDOSCOPY    DELIVERY            CURRENT MEDICATIONS       Discharge Medication List as of 2023  6:49 PM        CONTINUE these medications which have NOT CHANGED    Details   hydrOXYzine HCl (ATARAX) 25 MG tablet TAKE 1 TABLET BY MOUTH TWICE DAILY AS NEEDED FOR SEVERE PANIC ATTACKSHistorical Med      !! gabapentin (NEURONTIN) 100 MG capsule Take 1 capsule by mouth 3 times daily. Max Daily Amount: 300 mgHistorical Med      lurasidone (LATUDA) 40 MG TABS tablet TAKE 1 TABLET BY MOUTH EVERY NIGHT WITH AT LEAST 350 CALORIESHistorical Med      SUMAtriptan (IMITREX) 50 MG tablet TAKE 1 TABLET BY MOUTH ONE TIME AS NEEDED FOR MIGRAINE. MAY REPEAT DOSE ONCE IN 2 HOURS IF NO RELIEF.  DO NOT EXCEED 2 DOSES IN 24 HOURSHistorical Med      traZODone (DESYREL) 100 MG tablet TAKE 1.5 TO 2 of rash such as SJS/TEN, or secondary dangerous causes such as petechial rashes from thrombocytopenia or rickettsial infections. Rash does not appear urticarial with no signs of anaphylaxis either. Plan at this time is to treat with Lotrimin instruct to follow up with PCP. Counseled on hygiene measures to prevent reoccurrence                 Discussed results and work-up with patient and answered all questions, the patient expresses understanding and agrees with the care plan and disposition. The patient was given an opportunity to ask questions and all concerns raised were addressed prior to discharge. Recommended patient follow-up with provider as listed below. Counseled patient on standard home and self-care measures. Specifically explained the emergent conditions that could arise and clearly instructed the patient to return to the emergency department for those and any other new, worsening, or concerning symptoms. Patient stable and ready for discharge. DISPOSITION:  Discharge    IMPRESSION:   1. Tinea pedis, unspecified laterality        PLAN:   PATIENT REFERRED TO:   [facility] EMERGENCY DEPT  [address] [facility] [address] [phone]    As needed, If symptoms worsen    [facility] [address] [address]  [phone]  Schedule an appointment as soon as possible for a visit       DISCHARGE MEDICATIONS:  Discharge Medication List as of 8/5/2023  6:49 PM        START taking these medications    Details   clotrimazole (LOTRIMIN) 1 % cream Apply topically 2 times daily. , Disp-12 g, R-0, Normal           Return to the ED if worse      Please note that this dictation was completed with [employer], the computer voice recognition software. Quite often unanticipated grammatical, syntax, homophones, and other interpretive errors are inadvertently transcribed by the computer software. Please disregard these errors.   Please excuse any errors that have escaped final

## 2023-08-05 NOTE — ED NOTES
Patient does not appear to be in any acute distress/shows no evidence of clinical instability at this time. Reviewed discharge instructions, prescriptions, education and follow up information with patient. Patient verbalizing understanding. Patient at baseline cardiac, respiratory, and neuro function. Pain controlled. Patient left ER under baseline transfer modality.        Harley Martinez LPN  20/52/78 [id number]

## 2023-08-06 ASSESSMENT — ENCOUNTER SYMPTOMS
ABDOMINAL PAIN: 0
EYE DISCHARGE: 0
SHORTNESS OF BREATH: 0

## 2023-09-04 ENCOUNTER — HOSPITAL ENCOUNTER (EMERGENCY)
Facility: HOSPITAL | Age: 33
Discharge: HOME | End: 2023-09-04
Attending: STUDENT IN AN ORGANIZED HEALTH CARE EDUCATION/TRAINING PROGRAM
Payer: MEDICAID

## 2023-09-04 VITALS
SYSTOLIC BLOOD PRESSURE: 153 MMHG | BODY MASS INDEX: 37.56 KG/M2 | TEMPERATURE: 97.8 F | WEIGHT: 220 LBS | OXYGEN SATURATION: 98 % | HEART RATE: 80 BPM | HEIGHT: 64 IN | DIASTOLIC BLOOD PRESSURE: 110 MMHG | RESPIRATION RATE: 20 BRPM

## 2023-09-04 DIAGNOSIS — R06.02 SHORTNESS OF BREATH: Primary | ICD-10-CM

## 2023-09-04 PROCEDURE — 99281 EMR DPT VST MAYX REQ PHY/QHP: CPT

## 2023-09-04 PROCEDURE — 93005 ELECTROCARDIOGRAM TRACING: CPT | Performed by: EMERGENCY MEDICINE

## 2023-09-04 ASSESSMENT — PAIN SCALES - GENERAL: PAINLEVEL_OUTOF10: 7

## 2023-09-04 ASSESSMENT — PAIN - FUNCTIONAL ASSESSMENT: PAIN_FUNCTIONAL_ASSESSMENT: 0-10

## 2023-09-04 ASSESSMENT — PAIN DESCRIPTION - LOCATION: LOCATION: BACK

## 2023-09-05 ASSESSMENT — ENCOUNTER SYMPTOMS
COUGH: 1
SHORTNESS OF BREATH: 1

## 2023-09-05 NOTE — DISCHARGE INSTRUCTIONS
Please take your medications as prescribed and follow-up with your primary care doctor or pulmonologist.  Return as needed

## 2023-09-06 LAB
EKG ATRIAL RATE: 77 BPM
EKG DIAGNOSIS: NORMAL
EKG P AXIS: 51 DEGREES
EKG P-R INTERVAL: 170 MS
EKG Q-T INTERVAL: 374 MS
EKG QRS DURATION: 98 MS
EKG QTC CALCULATION (BAZETT): 423 MS
EKG R AXIS: 48 DEGREES
EKG T AXIS: 95 DEGREES
EKG VENTRICULAR RATE: 77 BPM

## 2023-09-06 PROCEDURE — 93010 ELECTROCARDIOGRAM REPORT: CPT | Performed by: STUDENT IN AN ORGANIZED HEALTH CARE EDUCATION/TRAINING PROGRAM

## 2024-01-02 ENCOUNTER — HOSPITAL ENCOUNTER (EMERGENCY)
Facility: HOSPITAL | Age: 34
Discharge: HOME | End: 2024-01-02
Attending: EMERGENCY MEDICINE

## 2024-01-02 VITALS
TEMPERATURE: 99 F | HEIGHT: 64 IN | BODY MASS INDEX: 36.7 KG/M2 | OXYGEN SATURATION: 100 % | WEIGHT: 215 LBS | DIASTOLIC BLOOD PRESSURE: 113 MMHG | HEART RATE: 99 BPM | RESPIRATION RATE: 16 BRPM | SYSTOLIC BLOOD PRESSURE: 161 MMHG

## 2024-01-02 DIAGNOSIS — R52 BODY ACHES: Primary | ICD-10-CM

## 2024-01-02 DIAGNOSIS — J06.9 VIRAL URI WITH COUGH: ICD-10-CM

## 2024-01-02 LAB
APPEARANCE UR: CLEAR
BACTERIA URNS QL MICRO: NEGATIVE /HPF
BILIRUB UR QL: NEGATIVE
COLOR UR: ABNORMAL
EPITH CASTS URNS QL MICRO: ABNORMAL /LPF
FLUAV AG NPH QL IA: NEGATIVE
FLUBV AG NOSE QL IA: NEGATIVE
GLUCOSE UR STRIP.AUTO-MCNC: NEGATIVE MG/DL
HGB UR QL STRIP: ABNORMAL
KETONES UR QL STRIP.AUTO: NEGATIVE MG/DL
LEUKOCYTE ESTERASE UR QL STRIP.AUTO: NEGATIVE
MUCOUS THREADS URNS QL MICRO: ABNORMAL /LPF
NITRITE UR QL STRIP.AUTO: NEGATIVE
PH UR STRIP: 6.5 (ref 5–8)
PROT UR STRIP-MCNC: NEGATIVE MG/DL
RBC #/AREA URNS HPF: ABNORMAL /HPF
SP GR UR REFRACTOMETRY: 1.02 (ref 1–1.03)
UROBILINOGEN UR QL STRIP.AUTO: 0.2 EU/DL (ref 0.2–1)
WBC URNS QL MICRO: ABNORMAL /HPF (ref 0–4)

## 2024-01-02 PROCEDURE — 99283 EMERGENCY DEPT VISIT LOW MDM: CPT

## 2024-01-02 PROCEDURE — 87804 INFLUENZA ASSAY W/OPTIC: CPT

## 2024-01-02 PROCEDURE — 87635 SARS-COV-2 COVID-19 AMP PRB: CPT

## 2024-01-02 PROCEDURE — 81001 URINALYSIS AUTO W/SCOPE: CPT

## 2024-01-02 RX ORDER — IBUPROFEN 800 MG/1
800 TABLET ORAL 2 TIMES DAILY PRN
Qty: 60 TABLET | Refills: 0 | Status: SHIPPED | OUTPATIENT
Start: 2024-01-02

## 2024-01-02 RX ORDER — BENZONATATE 100 MG/1
100 CAPSULE ORAL 2 TIMES DAILY PRN
Qty: 20 CAPSULE | Refills: 0 | Status: SHIPPED | OUTPATIENT
Start: 2024-01-02 | End: 2024-01-12

## 2024-01-02 RX ORDER — GUAIFENESIN 600 MG/1
600 TABLET, EXTENDED RELEASE ORAL 2 TIMES DAILY
Qty: 30 TABLET | Refills: 0 | Status: SHIPPED | OUTPATIENT
Start: 2024-01-02 | End: 2024-01-17

## 2024-01-02 ASSESSMENT — PAIN DESCRIPTION - LOCATION: LOCATION: HEAD;GENERALIZED

## 2024-01-02 ASSESSMENT — PAIN DESCRIPTION - DESCRIPTORS: DESCRIPTORS: ACHING

## 2024-01-02 ASSESSMENT — PAIN SCALES - GENERAL: PAINLEVEL_OUTOF10: 7

## 2024-01-02 ASSESSMENT — PAIN - FUNCTIONAL ASSESSMENT: PAIN_FUNCTIONAL_ASSESSMENT: 0-10

## 2024-01-02 ASSESSMENT — ENCOUNTER SYMPTOMS: COUGH: 1

## 2024-01-02 NOTE — ED TRIAGE NOTES
Pt comes in ambulatory no signs of acute distress. Pt's breathing appears regular and unlabored. Pt reports that yesterday she was coughing and unable to sleep. Today she is having aches all over.  Pt denies ear pain and or sore throat

## 2024-01-02 NOTE — DISCHARGE INSTRUCTIONS
Discussed visit today.  Please follow-up with your gynecologist if you have any worsening of abdominal pain or urinary frequency or vaginal bleeding.  Discussed to take Motrin at home for that pain.    Return to the ER with any worsening of symptoms

## 2024-01-02 NOTE — ED PROVIDER NOTES
[facility] EMERGENCY DEPT  EMERGENCY DEPARTMENT ENCOUNTER      Pt Name: Sandra Jorge  MRN: [id number]  Birthdate 1990  Date of evaluation: 1/2/2024  Provider: Fareed Oreilly PA-C    CHIEF COMPLAINT       Chief Complaint   Patient presents with    Fatigue    Cough         HISTORY OF PRESENT ILLNESS    Patient is an 33 y.o. female with history of asthma, neuropathy, gallstones, and fibroids who presents emergency room with reports of a cough that started last night that kept her up all night.  Patient ports she is unable to sleep because of the cough.  Today she reports having a headache and bodyaches.  Patient denies any chest pain, shortness of breath, abdominal pain, nausea or vomiting, diarrhea constipation, headache, dizziness, lightheadedness, fever, or chills.  Patient denies any ear pain or sore throat.  Patient has not taken anything prior to arrival.  Patient reports that last week she was diagnosed with a fibroid on the right side of her uterus and was having some brown vaginal bleeding, but this is now turned into white discharge.  Patient reports that she had a negative bacterial vaginosis and yeast scan and does not want another one.  Patient denies any chest pain, shortness of breath, abdominal pain, nausea or vomiting, diarrhea or constipation, headache, dizziness, lightheadedness, fever, or chills.  Patient reports alcohol use, everyday cigarette smoking, denies vaping or illicit drug use.          Nursing Notes were reviewed.    REVIEW OF SYSTEMS       Review of Systems   Constitutional:  Positive for chills.        Body aches   Respiratory:  Positive for cough.    Genitourinary:  Positive for frequency.   Neurological:  Positive for headaches.   All other systems reviewed and are negative.        PAST MEDICAL HISTORY     Past Medical History:   Diagnosis Date    Asthma     Ill-defined condition     neuropathy    Other ill-defined conditions(799.89)     gallstones         SURGICAL HISTORY

## 2024-01-02 NOTE — ED NOTES
Pt was discharged and given instructions by MD. Pt verbalized good understanding of all discharge instructions,3 prescriptions and F/U care. All questions answered. Pt in stable condition on discharge.

## 2024-01-03 LAB
SARS-COV-2 RNA RESP QL NAA+PROBE: NOT DETECTED
SOURCE: NORMAL

## 2024-05-03 ENCOUNTER — APPOINTMENT (OUTPATIENT)
Facility: HOSPITAL | Age: 34
End: 2024-05-03
Payer: MEDICAID

## 2024-05-03 ENCOUNTER — HOSPITAL ENCOUNTER (EMERGENCY)
Facility: HOSPITAL | Age: 34
Discharge: HOME | End: 2024-05-03
Attending: EMERGENCY MEDICINE
Payer: MEDICAID

## 2024-05-03 VITALS
OXYGEN SATURATION: 97 % | WEIGHT: 220 LBS | HEIGHT: 64 IN | TEMPERATURE: 98 F | SYSTOLIC BLOOD PRESSURE: 164 MMHG | DIASTOLIC BLOOD PRESSURE: 101 MMHG | BODY MASS INDEX: 37.56 KG/M2 | HEART RATE: 93 BPM | RESPIRATION RATE: 18 BRPM

## 2024-05-03 DIAGNOSIS — R10.9 ABDOMINAL PAIN, UNSPECIFIED ABDOMINAL LOCATION: Primary | ICD-10-CM

## 2024-05-03 DIAGNOSIS — Z98.890 STATUS POST EMBOLIZATION OF UTERINE ARTERY: ICD-10-CM

## 2024-05-03 LAB
ALBUMIN SERPL-MCNC: 4.3 G/DL (ref 3.5–5.2)
ALBUMIN/GLOB SERPL: 1.2 (ref 1.1–2.2)
ALP SERPL-CCNC: 103 U/L (ref 35–104)
ALT SERPL-CCNC: 46 U/L (ref 10–35)
ANION GAP SERPL CALC-SCNC: 14 MMOL/L (ref 5–15)
AST SERPL-CCNC: 43 U/L (ref 10–35)
BASOPHILS # BLD: 0 K/UL (ref 0–0.1)
BASOPHILS NFR BLD: 0 % (ref 0–1)
BILIRUB SERPL-MCNC: 1.3 MG/DL (ref 0.2–1)
BUN SERPL-MCNC: 14 MG/DL (ref 6–20)
BUN/CREAT SERPL: 17 (ref 12–20)
CALCIUM SERPL-MCNC: 9.8 MG/DL (ref 8.6–10)
CHLORIDE SERPL-SCNC: 99 MMOL/L (ref 98–107)
CO2 SERPL-SCNC: 22 MMOL/L (ref 22–29)
CREAT SERPL-MCNC: 0.84 MG/DL (ref 0.5–0.9)
DIFFERENTIAL METHOD BLD: ABNORMAL
EOSINOPHIL # BLD: 0.2 K/UL (ref 0–0.4)
EOSINOPHIL NFR BLD: 1 % (ref 0–7)
ERYTHROCYTE [DISTWIDTH] IN BLOOD BY AUTOMATED COUNT: 12.9 % (ref 11.5–14.5)
GLOBULIN SER CALC-MCNC: 3.6 G/DL (ref 2–4)
GLUCOSE SERPL-MCNC: 103 MG/DL (ref 65–100)
HCT VFR BLD AUTO: 46.2 % (ref 35–47)
HGB BLD-MCNC: 15.6 G/DL (ref 11.5–16)
IMM GRANULOCYTES # BLD AUTO: 0 K/UL
IMM GRANULOCYTES NFR BLD AUTO: 0 %
LYMPHOCYTES # BLD: 5.4 K/UL (ref 0.8–3.5)
LYMPHOCYTES NFR BLD: 24 % (ref 12–49)
MCH RBC QN AUTO: 29.4 PG (ref 26–34)
MCHC RBC AUTO-ENTMCNC: 33.8 G/DL (ref 30–36.5)
MCV RBC AUTO: 87.2 FL (ref 80–99)
MONOCYTES # BLD: 2.5 K/UL (ref 0–1)
MONOCYTES NFR BLD: 11 % (ref 5–13)
NEUTS SEG # BLD: 14.5 K/UL (ref 1.8–8)
NEUTS SEG NFR BLD: 64 % (ref 32–75)
NRBC # BLD: 0 K/UL (ref 0–0.01)
NRBC BLD-RTO: 0 PER 100 WBC
PLATELET # BLD AUTO: 372 K/UL (ref 150–400)
PMV BLD AUTO: 9.5 FL (ref 8.9–12.9)
POTASSIUM SERPL-SCNC: 3.8 MMOL/L (ref 3.5–5.1)
PROT SERPL-MCNC: 7.9 G/DL (ref 6.4–8.3)
RBC # BLD AUTO: 5.3 M/UL (ref 3.8–5.2)
RBC MORPH BLD: ABNORMAL
SODIUM SERPL-SCNC: 135 MMOL/L (ref 136–145)
WBC # BLD AUTO: 22.6 K/UL (ref 3.6–11)
WBC MORPH BLD: ABNORMAL

## 2024-05-03 PROCEDURE — 80053 COMPREHEN METABOLIC PANEL: CPT

## 2024-05-03 PROCEDURE — 85025 COMPLETE CBC W/AUTO DIFF WBC: CPT

## 2024-05-03 PROCEDURE — 74174 CTA ABD&PLVS W/CONTRAST: CPT

## 2024-05-03 PROCEDURE — 6360000002 HC RX W HCPCS: Performed by: EMERGENCY MEDICINE

## 2024-05-03 PROCEDURE — 6360000004 HC RX CONTRAST MEDICATION: Performed by: EMERGENCY MEDICINE

## 2024-05-03 PROCEDURE — 96375 TX/PRO/DX INJ NEW DRUG ADDON: CPT

## 2024-05-03 PROCEDURE — 96374 THER/PROPH/DIAG INJ IV PUSH: CPT

## 2024-05-03 PROCEDURE — 36415 COLL VENOUS BLD VENIPUNCTURE: CPT

## 2024-05-03 PROCEDURE — 99285 EMERGENCY DEPT VISIT HI MDM: CPT

## 2024-05-03 RX ORDER — NAPROXEN 500 MG/1
500 TABLET ORAL EVERY 12 HOURS PRN
Qty: 20 TABLET | Refills: 0 | Status: SHIPPED | OUTPATIENT
Start: 2024-05-03

## 2024-05-03 RX ORDER — KETOROLAC TROMETHAMINE 30 MG/ML
15 INJECTION, SOLUTION INTRAMUSCULAR; INTRAVENOUS ONCE
Status: COMPLETED | OUTPATIENT
Start: 2024-05-03 | End: 2024-05-03

## 2024-05-03 RX ORDER — POLYETHYLENE GLYCOL 3350 17 G/17G
17 POWDER, FOR SOLUTION ORAL DAILY PRN
Qty: 510 G | Refills: 0 | Status: SHIPPED | OUTPATIENT
Start: 2024-05-03 | End: 2024-06-02

## 2024-05-03 RX ORDER — ONDANSETRON 2 MG/ML
4 INJECTION INTRAMUSCULAR; INTRAVENOUS ONCE
Status: COMPLETED | OUTPATIENT
Start: 2024-05-03 | End: 2024-05-03

## 2024-05-03 RX ORDER — MORPHINE SULFATE 4 MG/ML
4 INJECTION, SOLUTION INTRAMUSCULAR; INTRAVENOUS
Status: COMPLETED | OUTPATIENT
Start: 2024-05-03 | End: 2024-05-03

## 2024-05-03 RX ADMIN — IOPAMIDOL 100 ML: 755 INJECTION, SOLUTION INTRAVENOUS at 17:51

## 2024-05-03 RX ADMIN — KETOROLAC TROMETHAMINE 15 MG: 30 INJECTION, SOLUTION INTRAMUSCULAR at 18:42

## 2024-05-03 RX ADMIN — ONDANSETRON 4 MG: 2 INJECTION INTRAMUSCULAR; INTRAVENOUS at 17:23

## 2024-05-03 RX ADMIN — MORPHINE SULFATE 4 MG: 4 INJECTION, SOLUTION INTRAMUSCULAR; INTRAVENOUS at 17:23

## 2024-05-03 ASSESSMENT — PAIN SCALES - GENERAL
PAINLEVEL_OUTOF10: 3
PAINLEVEL_OUTOF10: 0
PAINLEVEL_OUTOF10: 10
PAINLEVEL_OUTOF10: 10

## 2024-05-03 ASSESSMENT — PAIN - FUNCTIONAL ASSESSMENT: PAIN_FUNCTIONAL_ASSESSMENT: 0-10

## 2024-05-03 ASSESSMENT — PAIN DESCRIPTION - ORIENTATION: ORIENTATION: RIGHT

## 2024-05-03 ASSESSMENT — LIFESTYLE VARIABLES
HOW OFTEN DO YOU HAVE A DRINK CONTAINING ALCOHOL: NEVER
HOW MANY STANDARD DRINKS CONTAINING ALCOHOL DO YOU HAVE ON A TYPICAL DAY: PATIENT DOES NOT DRINK

## 2024-05-03 ASSESSMENT — PAIN DESCRIPTION - LOCATION: LOCATION: ABDOMEN;PELVIS

## 2024-05-03 NOTE — ED TRIAGE NOTES
Pt arrives to ER POV c/o right groin pain after UFE procedure on 5/1/24. Pt prescribed dilaudid but states it only helps for about 30 minutes, called her surgeon today and prescribed her percocet but she was not able to pick it up from the pharmacy. Pt reports she is having difficulty having  bowel movement since surgery.

## 2024-05-03 NOTE — ED PROVIDER NOTES
[facility] EMERGENCY DEPT  EMERGENCY DEPARTMENT ENCOUNTER      Pt Name: Sandra Jorge  MRN: [id number]  Birthdate 1990  Date of evaluation: 5/3/2024  Provider: Nikhil Braswell MD      HISTORY OF PRESENT ILLNESS      33-year-old female with history of asthma presents to the emergency department with chief complaint of abdominal pain and right groin pain.  She apparently had outpatient uterine artery embolization several days ago and had worsening pain beginning yesterday.  She has been on p.o. Dilaudid at home without relief    The history is provided by the patient and medical records.           Nursing Notes were reviewed.    REVIEW OF SYSTEMS         Review of Systems        PAST MEDICAL HISTORY     Past Medical History:   Diagnosis Date    Asthma     Ill-defined condition     neuropathy    Other ill-defined conditions(799.89)     gallstones         SURGICAL HISTORY       Past Surgical History:   Procedure Laterality Date     SECTION      CHOLECYSTECTOMY, LAPAROSCOPIC  14    COLONOSCOPY N/A 10/31/2019    COLONOSCOPY performed by Tray Martin MD at [facility] ENDOSCOPY    DELIVERY            CURRENT MEDICATIONS       Previous Medications    ALBUTEROL IN    Inhale into the lungs as needed    BUTALBITAL-APAP-CAFFEINE -40 MG CAPS PER CAPSULE    Take 1 capsule by mouth every 4 hours as needed Max Daily Amount: 6 capsules    CYCLOBENZAPRINE (FLEXERIL) 10 MG TABLET    Take by mouth 3 times daily as needed    DICLOFENAC (VOLTAREN) 75 MG EC TABLET    Take 1 tablet by mouth 2 times daily    FLUTICASONE (FLONASE) 50 MCG/ACT NASAL SPRAY    2 sprays by Nasal route daily    GABAPENTIN (NEURONTIN) 100 MG CAPSULE    Take 1 capsule by mouth 3 times daily. Max Daily Amount: 300 mg    GABAPENTIN (NEURONTIN) 300 MG CAPSULE    Take 1 capsule by mouth as needed.    HYDROXYZINE HCL (ATARAX) 25 MG TABLET    TAKE 1 TABLET BY MOUTH TWICE DAILY AS NEEDED FOR SEVERE PANIC ATTACKS    IBUPROFEN

## 2024-07-20 ENCOUNTER — APPOINTMENT (OUTPATIENT)
Facility: HOSPITAL | Age: 34
End: 2024-07-20
Payer: MEDICAID

## 2024-07-20 ENCOUNTER — HOSPITAL ENCOUNTER (EMERGENCY)
Facility: HOSPITAL | Age: 34
Discharge: HOME | End: 2024-07-20
Attending: EMERGENCY MEDICINE
Payer: MEDICAID

## 2024-07-20 VITALS
DIASTOLIC BLOOD PRESSURE: 100 MMHG | BODY MASS INDEX: 35.91 KG/M2 | HEART RATE: 92 BPM | OXYGEN SATURATION: 100 % | TEMPERATURE: 98.9 F | WEIGHT: 210.32 LBS | RESPIRATION RATE: 18 BRPM | SYSTOLIC BLOOD PRESSURE: 143 MMHG | HEIGHT: 64 IN

## 2024-07-20 DIAGNOSIS — R19.7 DIARRHEA, UNSPECIFIED TYPE: Primary | ICD-10-CM

## 2024-07-20 DIAGNOSIS — R11.0 NAUSEA WITHOUT VOMITING: ICD-10-CM

## 2024-07-20 DIAGNOSIS — D25.9 UTERINE LEIOMYOMA, UNSPECIFIED LOCATION: ICD-10-CM

## 2024-07-20 LAB
ANION GAP BLD CALC-SCNC: 12.2 MMOL/L (ref 10–20)
APPEARANCE UR: ABNORMAL
BACTERIA URNS QL MICRO: ABNORMAL /HPF
BASOPHILS # BLD: 0.1 K/UL (ref 0–1)
BASOPHILS NFR BLD: 1 % (ref 0–1)
BILIRUB UR QL: NEGATIVE
CA-I BLD-MCNC: 1.21 MMOL/L (ref 1.12–1.32)
CHLORIDE BLD-SCNC: 104 MMOL/L (ref 98–107)
CO2 BLD-SCNC: 23.8 MMOL/L (ref 21–32)
COLOR UR: ABNORMAL
CREAT BLD-MCNC: 0.79 MG/DL (ref 0.6–1.3)
DIFFERENTIAL METHOD BLD: ABNORMAL
EOSINOPHIL # BLD: 0.4 K/UL (ref 0–0.4)
EOSINOPHIL NFR BLD: 3 %
EPITH CASTS URNS QL MICRO: ABNORMAL /LPF
ERYTHROCYTE [DISTWIDTH] IN BLOOD BY AUTOMATED COUNT: 12.9 % (ref 11.5–14.5)
GLUCOSE BLD-MCNC: 93 MG/DL (ref 70–110)
GLUCOSE UR STRIP.AUTO-MCNC: NEGATIVE MG/DL
HCG UR QL: NEGATIVE
HCT VFR BLD AUTO: 40 % (ref 35–47)
HGB BLD-MCNC: 13.5 G/DL (ref 11.5–16)
HGB UR QL STRIP: ABNORMAL
IMM GRANULOCYTES # BLD AUTO: 0.1 K/UL (ref 0–0.04)
IMM GRANULOCYTES NFR BLD AUTO: 1 % (ref 0–0.5)
KETONES UR QL STRIP.AUTO: ABNORMAL MG/DL
LEUKOCYTE ESTERASE UR QL STRIP.AUTO: NEGATIVE
LYMPHOCYTES # BLD: 4.8 K/UL (ref 0.8–3.5)
LYMPHOCYTES NFR BLD: 36 % (ref 12–49)
MCH RBC QN AUTO: 29.5 PG (ref 26–34)
MCHC RBC AUTO-ENTMCNC: 33.8 G/DL (ref 30–36.5)
MCV RBC AUTO: 87.3 FL (ref 80–99)
MONOCYTES # BLD: 0.8 K/UL (ref 0–1)
MONOCYTES NFR BLD: 6 % (ref 5–13)
MUCOUS THREADS URNS QL MICRO: ABNORMAL /LPF
NEUTS SEG # BLD: 7 K/UL (ref 1.8–8)
NEUTS SEG NFR BLD: 53 % (ref 32–75)
NITRITE UR QL STRIP.AUTO: NEGATIVE
NRBC # BLD: 0 K/UL (ref 0–0.01)
NRBC BLD-RTO: 0 PER 100 WBC
PH UR STRIP: 7.5 (ref 5–8)
PLATELET # BLD AUTO: 362 K/UL (ref 150–400)
PMV BLD AUTO: 9.4 FL (ref 8.9–12.9)
POTASSIUM BLD-SCNC: 3.7 MMOL/L (ref 3.5–5.1)
PROT UR STRIP-MCNC: NEGATIVE MG/DL
RBC # BLD AUTO: 4.58 M/UL (ref 3.8–5.2)
RBC #/AREA URNS HPF: ABNORMAL /HPF
SERVICE CMNT-IMP: NORMAL
SODIUM BLD-SCNC: 140 MMOL/L (ref 136–145)
SP GR UR REFRACTOMETRY: 1.02 (ref 1–1.03)
SPECIMEN HOLD: NORMAL
UROBILINOGEN UR QL STRIP.AUTO: 2 EU/DL (ref 0.2–1)
WBC # BLD AUTO: 13.3 K/UL (ref 3.6–11)
WBC URNS QL MICRO: ABNORMAL /HPF (ref 0–4)

## 2024-07-20 PROCEDURE — 81025 URINE PREGNANCY TEST: CPT

## 2024-07-20 PROCEDURE — 99285 EMERGENCY DEPT VISIT HI MDM: CPT

## 2024-07-20 PROCEDURE — 81001 URINALYSIS AUTO W/SCOPE: CPT

## 2024-07-20 PROCEDURE — 80047 BASIC METABLC PNL IONIZED CA: CPT

## 2024-07-20 PROCEDURE — 6370000000 HC RX 637 (ALT 250 FOR IP): Performed by: EMERGENCY MEDICINE

## 2024-07-20 PROCEDURE — 74177 CT ABD & PELVIS W/CONTRAST: CPT

## 2024-07-20 PROCEDURE — 85025 COMPLETE CBC W/AUTO DIFF WBC: CPT

## 2024-07-20 PROCEDURE — 6360000004 HC RX CONTRAST MEDICATION: Performed by: EMERGENCY MEDICINE

## 2024-07-20 RX ORDER — ONDANSETRON 4 MG/1
4 TABLET, ORALLY DISINTEGRATING ORAL 3 TIMES DAILY PRN
Qty: 21 TABLET | Refills: 0 | Status: SHIPPED | OUTPATIENT
Start: 2024-07-20

## 2024-07-20 RX ORDER — ONDANSETRON 2 MG/ML
4 INJECTION INTRAMUSCULAR; INTRAVENOUS ONCE
Status: CANCELLED | OUTPATIENT
Start: 2024-07-20 | End: 2024-07-20

## 2024-07-20 RX ORDER — DICYCLOMINE HYDROCHLORIDE 10 MG/1
10 CAPSULE ORAL
Qty: 20 CAPSULE | Refills: 0 | Status: SHIPPED | OUTPATIENT
Start: 2024-07-20

## 2024-07-20 RX ORDER — ONDANSETRON 4 MG/1
4 TABLET, ORALLY DISINTEGRATING ORAL
Status: COMPLETED | OUTPATIENT
Start: 2024-07-20 | End: 2024-07-20

## 2024-07-20 RX ORDER — 0.9 % SODIUM CHLORIDE 0.9 %
1000 INTRAVENOUS SOLUTION INTRAVENOUS ONCE
Status: CANCELLED | OUTPATIENT
Start: 2024-07-20 | End: 2024-07-20

## 2024-07-20 RX ADMIN — ONDANSETRON 4 MG: 4 TABLET, ORALLY DISINTEGRATING ORAL at 15:32

## 2024-07-20 RX ADMIN — IOPAMIDOL 100 ML: 755 INJECTION, SOLUTION INTRAVENOUS at 16:06

## 2024-07-20 ASSESSMENT — ENCOUNTER SYMPTOMS
DIARRHEA: 1
BACK PAIN: 0
SHORTNESS OF BREATH: 0
COUGH: 0
NAUSEA: 1
ABDOMINAL PAIN: 0
VOMITING: 0
SORE THROAT: 0
COLOR CHANGE: 0

## 2024-07-20 ASSESSMENT — LIFESTYLE VARIABLES
HOW MANY STANDARD DRINKS CONTAINING ALCOHOL DO YOU HAVE ON A TYPICAL DAY: PATIENT DOES NOT DRINK
HOW OFTEN DO YOU HAVE A DRINK CONTAINING ALCOHOL: NEVER

## 2024-07-20 ASSESSMENT — PAIN - FUNCTIONAL ASSESSMENT: PAIN_FUNCTIONAL_ASSESSMENT: 0-10

## 2024-07-20 ASSESSMENT — PAIN SCALES - GENERAL: PAINLEVEL_OUTOF10: 6

## 2024-07-20 ASSESSMENT — PAIN DESCRIPTION - LOCATION: LOCATION: ABDOMEN

## 2024-07-20 NOTE — ED PROVIDER NOTES
[facility] [place]  EMERGENCY DEPARTMENT ENCOUNTER      Pt Name: Sandra Jorge  MRN: [id number]  Birthdate 1990  Date of evaluation: 7/20/2024  Provider: WILMA Du    CHIEF COMPLAINT       Chief Complaint   Patient presents with    Diarrhea         HISTORY OF PRESENT ILLNESS   (Location/Symptom, Timing/Onset, Context/Setting, Quality, Duration, Modifying Factors, Severity)  Note limiting factors.   Sandra Jorge is a 34 y.o. female with past medical history as listed below who presents to the emergency department for evaluation of diarrhea which has been ongoing for the past several days.  Endorses 4-5 episodes of loose stools daily.  States a coworker is also sick with similar symptoms.  She has had some nausea but no vomiting.  Endorses some intermittent abdominal cramping and discomfort but denies any currently.  Denies fever, cough, congestion, bloody stool..  Denies urinary symptoms.      Nursing Notes were reviewed.    REVIEW OF SYSTEMS    (2-9 systems for level 4, 10 or more for level 5)     Review of Systems   Constitutional:  Negative for fever.   HENT:  Negative for congestion and sore throat.    Eyes:  Negative for visual disturbance.   Respiratory:  Negative for cough and shortness of breath.    Cardiovascular:  Negative for chest pain.   Gastrointestinal:  Positive for diarrhea and nausea. Negative for abdominal pain and vomiting.   Genitourinary:  Negative for dysuria.   Musculoskeletal:  Negative for back pain and neck pain.   Skin:  Negative for color change.   Neurological:  Negative for dizziness and headaches.   Psychiatric/Behavioral:  Negative for confusion.        Except as noted above the remainder of the review of systems was reviewed and negative.       PAST MEDICAL HISTORY     Past Medical History:   Diagnosis Date    Asthma     Ill-defined condition     neuropathy    Other ill-defined conditions(189.50)     gallstones       SURGICAL HISTORY       Past Surgical  K/UL    Eosinophils Absolute 0.4 0.0 - 0.4 K/UL    Basophils Absolute 0.1 0 - 1 K/UL    Immature Granulocytes Absolute 0.1 (H) 0.00 - 0.04 K/UL    Differential Type AUTOMATED     Urinalysis with Microscopic    Collection Time: 07/20/24  3:29 PM   Result Value Ref Range    Color, UA YELLOW/STRAW      Appearance HAZY (A) CLEAR      Specific Gravity, UA 1.020 1.003 - 1.030      pH, Urine 7.5 5.0 - 8.0      Protein, UA Negative NEG mg/dL    Glucose, Ur Negative NEG mg/dL    Ketones, Urine TRACE (A) NEG mg/dL    Bilirubin, Urine Negative NEG      Blood, Urine TRACE (A) NEG      Urobilinogen, Urine 2.0 (H) 0.2 - 1.0 EU/dL    Nitrite, Urine Negative NEG      Leukocyte Esterase, Urine Negative NEG      WBC, UA 0-4 0 - 4 /hpf    RBC, UA 5-10 /hpf    Epithelial Cells, UA MODERATE (A) FEW /lpf    BACTERIA, URINE 1+ (A) NEG /hpf    Mucus, UA 1+ (A) NEG /lpf   Urine Culture Hold Sample    Collection Time: 07/20/24  3:29 PM    Specimen: Urine   Result Value Ref Range    Specimen HOld        Urine on hold in Microbiology dept for 2 days.  If unpreserved urine is submitted, it cannot be used for addtional testing after 24 hours, recollection will be required.   POC CHEM 8    Collection Time: 07/20/24  3:33 PM   Result Value Ref Range    POC Ionized Calcium 1.21 1.12 - 1.32 mmol/L    POC Sodium 140 136 - 145 mmol/L    POC Potassium 3.7 3.5 - 5.1 mmol/L    POC Chloride 104 98 - 107 mmol/L    POC TCO2 23.8 21 - 32 mmol/L    Anion Gap, POC 12.2 10 - 20 mmol/L    POC Glucose 93 70 - 110 mg/dL    POC Creatinine 0.79 0.6 - 1.3 mg/dL    eGFR, POC >90 >60 ml/min/1.73m2    UA Comment Comment Not Indicated.     POC Pregnancy Urine Qual    Collection Time: 07/20/24  3:59 PM   Result Value Ref Range    Preg Test, Ur Negative NEG         IMAGING RESULTS:   CT ABDOMEN PELVIS W IV CONTRAST Additional Contrast? None   Final Result   No acute process. Pedunculated uterine fibroid.      Electronically signed by John ENGLE

## 2024-07-20 NOTE — ED TRIAGE NOTES
Patient arrives to ed via pov. Pt sts she started 1 week ago with diarrhea that is urgent and nausea. Pt sts this feeling is intermittent however she has not had a normal BM in a week. Pt denies fevers, vomiting or any further complaints.

## 2024-09-07 ENCOUNTER — HOSPITAL ENCOUNTER (EMERGENCY)
Facility: HOSPITAL | Age: 34
Discharge: HOME | End: 2024-09-08
Attending: STUDENT IN AN ORGANIZED HEALTH CARE EDUCATION/TRAINING PROGRAM
Payer: MEDICAID

## 2024-09-07 VITALS
BODY MASS INDEX: 35.51 KG/M2 | HEART RATE: 78 BPM | SYSTOLIC BLOOD PRESSURE: 153 MMHG | HEIGHT: 64 IN | WEIGHT: 208 LBS | TEMPERATURE: 98.7 F | RESPIRATION RATE: 20 BRPM | OXYGEN SATURATION: 99 % | DIASTOLIC BLOOD PRESSURE: 113 MMHG

## 2024-09-07 DIAGNOSIS — H01.004 BLEPHARITIS OF LEFT UPPER EYELID, UNSPECIFIED TYPE: Primary | ICD-10-CM

## 2024-09-07 PROCEDURE — 99283 EMERGENCY DEPT VISIT LOW MDM: CPT

## 2024-09-07 ASSESSMENT — PAIN - FUNCTIONAL ASSESSMENT: PAIN_FUNCTIONAL_ASSESSMENT: NONE - DENIES PAIN

## 2024-09-08 PROCEDURE — 6370000000 HC RX 637 (ALT 250 FOR IP): Performed by: STUDENT IN AN ORGANIZED HEALTH CARE EDUCATION/TRAINING PROGRAM

## 2024-09-08 RX ORDER — ERYTHROMYCIN 5 MG/G
OINTMENT OPHTHALMIC
Qty: 3.5 G | Refills: 0 | Status: SHIPPED | OUTPATIENT
Start: 2024-09-08 | End: 2024-09-18

## 2024-09-08 RX ORDER — ERYTHROMYCIN 5 MG/G
OINTMENT OPHTHALMIC
Status: COMPLETED | OUTPATIENT
Start: 2024-09-08 | End: 2024-09-08

## 2024-09-08 RX ADMIN — ERYTHROMYCIN: 5 OINTMENT OPHTHALMIC at 01:02

## 2024-10-27 ENCOUNTER — HOSPITAL ENCOUNTER (EMERGENCY)
Facility: HOSPITAL | Age: 34
Discharge: LEFT BEFORE DISCHARGE | End: 2024-10-27
Attending: EMERGENCY MEDICINE
Payer: MEDICAID

## 2024-10-27 VITALS
BODY MASS INDEX: 35.85 KG/M2 | SYSTOLIC BLOOD PRESSURE: 148 MMHG | HEART RATE: 77 BPM | HEIGHT: 64 IN | DIASTOLIC BLOOD PRESSURE: 105 MMHG | WEIGHT: 210 LBS | RESPIRATION RATE: 20 BRPM | OXYGEN SATURATION: 97 % | TEMPERATURE: 97.8 F

## 2024-10-27 DIAGNOSIS — R51.9 NONINTRACTABLE HEADACHE, UNSPECIFIED CHRONICITY PATTERN, UNSPECIFIED HEADACHE TYPE: ICD-10-CM

## 2024-10-27 DIAGNOSIS — R10.84 GENERALIZED ABDOMINAL PAIN: Primary | ICD-10-CM

## 2024-10-27 PROCEDURE — 99281 EMR DPT VST MAYX REQ PHY/QHP: CPT

## 2024-10-27 RX ORDER — METOCLOPRAMIDE HYDROCHLORIDE 5 MG/ML
10 INJECTION INTRAMUSCULAR; INTRAVENOUS
Status: DISCONTINUED | OUTPATIENT
Start: 2024-10-27 | End: 2024-10-27 | Stop reason: HOSPADM

## 2024-10-27 RX ORDER — IOPAMIDOL 755 MG/ML
100 INJECTION, SOLUTION INTRAVASCULAR
Status: DISCONTINUED | OUTPATIENT
Start: 2024-10-27 | End: 2024-10-27

## 2024-10-27 RX ORDER — DIPHENHYDRAMINE HYDROCHLORIDE 50 MG/ML
25 INJECTION INTRAMUSCULAR; INTRAVENOUS
Status: DISCONTINUED | OUTPATIENT
Start: 2024-10-27 | End: 2024-10-27 | Stop reason: HOSPADM

## 2024-10-27 RX ORDER — KETOROLAC TROMETHAMINE 30 MG/ML
30 INJECTION, SOLUTION INTRAMUSCULAR; INTRAVENOUS
Status: DISCONTINUED | OUTPATIENT
Start: 2024-10-27 | End: 2024-10-27 | Stop reason: HOSPADM

## 2024-10-27 NOTE — ED PROVIDER NOTES
Exam  Constitutional:       General: She is not in acute distress.     Appearance: Normal appearance. She is not ill-appearing, toxic-appearing or diaphoretic.   HENT:      Head: Normocephalic.      Mouth/Throat:      Mouth: Mucous membranes are moist.      Pharynx: Oropharynx is clear. No oropharyngeal exudate or posterior oropharyngeal erythema.   Eyes:      General: No scleral icterus.     Extraocular Movements: Extraocular movements intact.      Conjunctiva/sclera: Conjunctivae normal.      Pupils: Pupils are equal, round, and reactive to light.   Cardiovascular:      Rate and Rhythm: Normal rate and regular rhythm.      Pulses: Normal pulses.      Heart sounds: No murmur heard.     No friction rub.   Pulmonary:      Effort: Pulmonary effort is normal. No respiratory distress.      Breath sounds: Normal breath sounds. No stridor. No wheezing, rhonchi or rales.   Abdominal:      General: Abdomen is flat. There is no distension.      Palpations: Abdomen is soft.      Tenderness: There is generalized abdominal tenderness. There is no guarding or rebound.   Musculoskeletal:         General: No swelling.      Cervical back: Normal range of motion. No rigidity.      Right lower leg: No edema.      Left lower leg: No edema.   Skin:     General: Skin is warm and dry.      Capillary Refill: Capillary refill takes less than 2 seconds.      Coloration: Skin is not jaundiced.   Neurological:      General: No focal deficit present.      Mental Status: She is alert.      Cranial Nerves: No cranial nerve deficit.      Sensory: No sensory deficit.      Motor: No weakness.      Coordination: Coordination normal.         DIAGNOSTIC RESULTS     EKG: All EKG's are interpreted by the Emergency Department Physician who either signs or Co-signs this chart in the absence of a cardiologist.      RADIOLOGY:   Interpretation per the Radiologist below, if available at the time of this note:    No orders to display        LABS:  No visits

## 2024-10-27 NOTE — ED TRIAGE NOTES
Pt ambulatory into ED with cc constipation and headache. Pt reports constipation x2 weeks. Reports some relief after drinking prune juice, teas and OTC enema. Pt reports after BM, headache and abdominal pain briefly after. Denies pain meds PTA

## 2024-12-19 ENCOUNTER — HOSPITAL ENCOUNTER (EMERGENCY)
Facility: HOSPITAL | Age: 34
Discharge: HOME | End: 2024-12-19
Attending: STUDENT IN AN ORGANIZED HEALTH CARE EDUCATION/TRAINING PROGRAM
Payer: MEDICAID

## 2024-12-19 VITALS
TEMPERATURE: 98.1 F | BODY MASS INDEX: 36.54 KG/M2 | HEART RATE: 78 BPM | OXYGEN SATURATION: 97 % | HEIGHT: 64 IN | RESPIRATION RATE: 18 BRPM | WEIGHT: 214 LBS | DIASTOLIC BLOOD PRESSURE: 113 MMHG | SYSTOLIC BLOOD PRESSURE: 150 MMHG

## 2024-12-19 DIAGNOSIS — J02.9 ACUTE PHARYNGITIS, UNSPECIFIED ETIOLOGY: Primary | ICD-10-CM

## 2024-12-19 LAB — S PYO DNA THROAT QL NAA+PROBE: NOT DETECTED

## 2024-12-19 PROCEDURE — 99283 EMERGENCY DEPT VISIT LOW MDM: CPT

## 2024-12-19 PROCEDURE — 87651 STREP A DNA AMP PROBE: CPT

## 2024-12-19 ASSESSMENT — ENCOUNTER SYMPTOMS
CONSTIPATION: 0
VOMITING: 0
SORE THROAT: 1
DIARRHEA: 0
SHORTNESS OF BREATH: 0
ABDOMINAL PAIN: 0
COUGH: 0
COLOR CHANGE: 0
BACK PAIN: 0
RHINORRHEA: 0
NAUSEA: 0

## 2024-12-19 ASSESSMENT — PAIN SCALES - GENERAL: PAINLEVEL_OUTOF10: 8

## 2024-12-19 ASSESSMENT — PAIN - FUNCTIONAL ASSESSMENT: PAIN_FUNCTIONAL_ASSESSMENT: 0-10

## 2024-12-19 ASSESSMENT — PAIN DESCRIPTION - LOCATION: LOCATION: EAR;THROAT

## 2024-12-20 NOTE — ED PROVIDER NOTES
membranes are moist.      Pharynx: Uvula midline. Posterior oropharyngeal erythema present. No pharyngeal swelling, oropharyngeal exudate or uvula swelling.      Tonsils: No tonsillar exudate.   Eyes:      Conjunctiva/sclera: Conjunctivae normal.      Pupils: Pupils are equal, round, and reactive to light.   Cardiovascular:      Rate and Rhythm: Normal rate and regular rhythm.      Heart sounds: No murmur heard.     No friction rub. No gallop.   Pulmonary:      Effort: Pulmonary effort is normal.      Breath sounds: Normal breath sounds.   Abdominal:      General: There is no distension.      Tenderness: There is no abdominal tenderness.   Musculoskeletal:      Cervical back: Normal range of motion and neck supple.   Skin:     Capillary Refill: Capillary refill takes less than 2 seconds.      Findings: No rash.   Neurological:      General: No focal deficit present.      Mental Status: She is alert.         DIAGNOSTIC RESULTS     EKG: All EKG's are interpreted by the Emergency Department Physician who either signs or Co-signs this chart in the absence of a cardiologist.        RADIOLOGY:   Non-plain film images such as CT, Ultrasound and MRI are read by the radiologist. Plain radiographic images are visualized and preliminarily interpreted by the emergency physician with the below findings:        Interpretation per the Radiologist below, if available at the time of this note:    No orders to display        LABS:  Labs Reviewed   STREP A, PCR       All other labs were within normal range or not returned as of this dictation.    EMERGENCY DEPARTMENT COURSE and DIFFERENTIAL DIAGNOSIS/MDM:   Vitals:    Vitals:    12/19/24 2119   BP: (!) 150/113   Pulse: 78   Resp: 18   Temp: 98.1 °F (36.7 °C)   TempSrc: Oral   SpO2: 97%   Weight: 97.1 kg (214 lb)   Height: 1.626 m (5' 4\")           Medical Decision Making  34-year-old female with complaint of sore throat for the past 2 days.  She she appears clinically well with stable

## 2024-12-20 NOTE — DISCHARGE INSTRUCTIONS
Continue warm gargles for symptomatic relief.  Follow-up with your regular doctor.  Return to the emergency department for any new or worsening or more concerning symptoms.

## 2024-12-20 NOTE — ED TRIAGE NOTES
Pt ambulatory to ED w/ c/o sore throat x 2 days and right ear pain. Pt denies abdominal pain, N/V/D. Pt denies medications PTA.

## 2025-02-18 ENCOUNTER — HOSPITAL ENCOUNTER (EMERGENCY)
Facility: HOSPITAL | Age: 35
Discharge: HOME | End: 2025-02-18
Attending: STUDENT IN AN ORGANIZED HEALTH CARE EDUCATION/TRAINING PROGRAM
Payer: MEDICAID

## 2025-02-18 VITALS
HEART RATE: 91 BPM | HEIGHT: 64 IN | WEIGHT: 211.64 LBS | BODY MASS INDEX: 36.13 KG/M2 | OXYGEN SATURATION: 100 % | DIASTOLIC BLOOD PRESSURE: 109 MMHG | RESPIRATION RATE: 18 BRPM | SYSTOLIC BLOOD PRESSURE: 163 MMHG | TEMPERATURE: 97.5 F

## 2025-02-18 DIAGNOSIS — Z87.898 HISTORY OF GROSS HEMATURIA: ICD-10-CM

## 2025-02-18 DIAGNOSIS — R35.0 URINARY FREQUENCY: Primary | ICD-10-CM

## 2025-02-18 DIAGNOSIS — R31.0 GROSS HEMATURIA: ICD-10-CM

## 2025-02-18 LAB
APPEARANCE UR: ABNORMAL
BACTERIA URNS QL MICRO: NEGATIVE /HPF
BILIRUB UR QL: NEGATIVE
COLOR UR: ABNORMAL
EPITH CASTS URNS QL MICRO: ABNORMAL /LPF
GLUCOSE UR STRIP.AUTO-MCNC: NEGATIVE MG/DL
HCG UR QL: NEGATIVE
HGB UR QL STRIP: ABNORMAL
KETONES UR QL STRIP.AUTO: NEGATIVE MG/DL
LEUKOCYTE ESTERASE UR QL STRIP.AUTO: NEGATIVE
NITRITE UR QL STRIP.AUTO: NEGATIVE
PH UR STRIP: 6 (ref 5–8)
PROT UR STRIP-MCNC: NEGATIVE MG/DL
RBC #/AREA URNS HPF: ABNORMAL /HPF
SP GR UR REFRACTOMETRY: 1.02 (ref 1–1.03)
SPECIMEN HOLD: NORMAL
UROBILINOGEN UR QL STRIP.AUTO: 0.2 EU/DL (ref 0.2–1)
WBC URNS QL MICRO: ABNORMAL /HPF (ref 0–4)

## 2025-02-18 PROCEDURE — 6370000000 HC RX 637 (ALT 250 FOR IP): Performed by: STUDENT IN AN ORGANIZED HEALTH CARE EDUCATION/TRAINING PROGRAM

## 2025-02-18 PROCEDURE — 81025 URINE PREGNANCY TEST: CPT

## 2025-02-18 PROCEDURE — 99283 EMERGENCY DEPT VISIT LOW MDM: CPT

## 2025-02-18 PROCEDURE — 81001 URINALYSIS AUTO W/SCOPE: CPT

## 2025-02-18 RX ORDER — IBUPROFEN 600 MG/1
600 TABLET, FILM COATED ORAL
Status: COMPLETED | OUTPATIENT
Start: 2025-02-18 | End: 2025-02-18

## 2025-02-18 RX ADMIN — IBUPROFEN 600 MG: 600 TABLET, FILM COATED ORAL at 07:00

## 2025-02-18 ASSESSMENT — PAIN - FUNCTIONAL ASSESSMENT: PAIN_FUNCTIONAL_ASSESSMENT: 0-10

## 2025-02-18 ASSESSMENT — PAIN SCALES - GENERAL: PAINLEVEL_OUTOF10: 6

## 2025-02-18 NOTE — ED TRIAGE NOTES
Patient arrives to ED ambulatory, Patient reports  [facility] because she had urinary frequency. Patient reports she was given antibiotics but did not take them because [facility] called and told her she did not have a UTI but instead had \"Genital parts\". Patient reports she is still experiencing urinary frequency and noticed a strong odor to her urine.     Patient endorses R lower back pain    Patient denies taking any meds PTA.

## 2025-02-18 NOTE — ED PROVIDER NOTES
[facility]  EMERGENCY DEPARTMENT ENCOUNTER      Pt Name: Sandra Jorge  MRN: [id number]  Birthdate 1990  Date of evaluation: 2025  Provider: Mi Fenton MD    CHIEF COMPLAINT       Chief Complaint   Patient presents with    Urinary Frequency         HISTORY OF PRESENT ILLNESS    Nursing Triage Notes were reviewed.    HPI    Sandra Jorge is a 34 y.o. female with a history of chronic gross hematuria, uterine leiomyoma embolization, asthma, KESHIA who presents to the emergency department for evaluation of urinary frequency and malodorous urine.  Patient states that [facility] and was diagnosed with a UTI.  States that she was given an antibiotic but did not fill it for a few days and then received a call back that they did not think she had a UTI and that her urine sample showed \"genital parts\".  She states she has not taken an antibiotic for this reason though she still has Bactrim at home.  She reports her she has continued to have urinary frequency and has urinated 5 times already this morning.  She complains that her urine has a strong odor like pneumonia.  She denies any associated abdominal pain, fevers, chills, flank pain.  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  She reports that she has a chronic history of hematuria for the last 7 years.  She reports she has never seen a urologist for this, but does remember having multiple cystoscopies in childhood. Does not know why she had cystoscopies in childhood.         PAST MEDICAL HISTORY     Past Medical History:   Diagnosis Date    Asthma     Ill-defined condition     neuropathy    Other ill-defined conditions(799.78)     gallstones         SURGICAL HISTORY       Past Surgical History:   Procedure Laterality Date     SECTION      CHOLECYSTECTOMY, LAPAROSCOPIC  14

## 2025-04-24 ENCOUNTER — HOSPITAL ENCOUNTER (EMERGENCY)
Facility: HOSPITAL | Age: 35
Discharge: HOME | End: 2025-04-24
Attending: STUDENT IN AN ORGANIZED HEALTH CARE EDUCATION/TRAINING PROGRAM
Payer: MEDICAID

## 2025-04-24 VITALS
OXYGEN SATURATION: 98 % | HEIGHT: 64 IN | DIASTOLIC BLOOD PRESSURE: 115 MMHG | SYSTOLIC BLOOD PRESSURE: 170 MMHG | TEMPERATURE: 98.5 F | RESPIRATION RATE: 18 BRPM | BODY MASS INDEX: 34.66 KG/M2 | HEART RATE: 98 BPM | WEIGHT: 203 LBS
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
## 2025-04-24 DIAGNOSIS — S16.1XXA STRAIN OF NECK MUSCLE, INITIAL ENCOUNTER: Primary | ICD-10-CM

## 2025-04-24 DIAGNOSIS — S23.9XXA THORACIC SPRAIN: ICD-10-CM

## 2025-04-24 PROCEDURE — 6370000000 HC RX 637 (ALT 250 FOR IP)

## 2025-04-24 PROCEDURE — 99283 EMERGENCY DEPT VISIT LOW MDM: CPT

## 2025-04-24 RX ORDER — LIDOCAINE 4 G/G
1 PATCH TOPICAL
Status: DISCONTINUED | OUTPATIENT
Start: 2025-04-24 | End: 2025-04-24 | Stop reason: HOSPADM

## 2025-04-24 RX ORDER — METHOCARBAMOL 750 MG/1
750 TABLET, FILM COATED ORAL 4 TIMES DAILY
Qty: 28 TABLET | Refills: 0 | Status: SHIPPED | OUTPATIENT
Start: 2025-04-24 | End: 2025-05-01

## 2025-04-24 RX ORDER — IBUPROFEN 800 MG/1
800 TABLET, FILM COATED ORAL
Status: COMPLETED | OUTPATIENT
Start: 2025-04-24 | End: 2025-04-24

## 2025-04-24 RX ADMIN — IBUPROFEN 800 MG: 800 TABLET, FILM COATED ORAL at 14:00

## 2025-04-24 ASSESSMENT — PAIN - FUNCTIONAL ASSESSMENT: PAIN_FUNCTIONAL_ASSESSMENT: 0-10

## 2025-04-24 ASSESSMENT — PAIN SCALES - GENERAL
PAINLEVEL_OUTOF10: 8
PAINLEVEL_OUTOF10: 8

## 2025-04-24 ASSESSMENT — PAIN DESCRIPTION - DESCRIPTORS: DESCRIPTORS: ACHING

## 2025-04-24 ASSESSMENT — PAIN DESCRIPTION - LOCATION: LOCATION: HEAD

## 2025-04-24 ASSESSMENT — PAIN DESCRIPTION - ORIENTATION: ORIENTATION: RIGHT
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
## 2025-04-24 NOTE — DISCHARGE INSTRUCTIONS
Please take caution while using the prescribed Robaxin.  It is a muscle relaxer and will help with your neck and back pain however can make you sleepy.  Do not drive or participate in strenuous activity until you know how it will affect you.  If symptoms persist please follow-up with the attached orthopedic group.  If you begin to develop weakness, numbness, increased pain or any new or worsening symptoms please return to the ED.

## 2025-04-24 NOTE — ED NOTES
Patient does not appear to be in any acute distress/shows no evidence of clinical instability at this time.     Provider has reviewed discharge instructions with the patient.  The patient verbalized understanding of discharge instructions as well as need for follow up for any worsening or further symptoms. PCP follow up recommended as well as specialist follow up: Ortho      Discharge papers given to patient, education provided, and all questions answered. Patient discharged by provider.    Patient leaving ED in private vehicle, ambulatory. Pt A&OX4, RA.     MD made aware of elevated BP readings prior to discharge. Pt provided with a list of primary care doctors and informed to monitor BP at home and schedule an appt

## 2025-04-28 ENCOUNTER — APPOINTMENT (OUTPATIENT)
Facility: HOSPITAL | Age: 35
End: 2025-04-28
Payer: MEDICAID

## 2025-04-28 ENCOUNTER — HOSPITAL ENCOUNTER (EMERGENCY)
Facility: HOSPITAL | Age: 35
Discharge: HOME | End: 2025-04-28
Attending: STUDENT IN AN ORGANIZED HEALTH CARE EDUCATION/TRAINING PROGRAM
Payer: MEDICAID

## 2025-04-28 VITALS
DIASTOLIC BLOOD PRESSURE: 92 MMHG | RESPIRATION RATE: 20 BRPM | BODY MASS INDEX: 34.66 KG/M2 | HEIGHT: 64 IN | HEART RATE: 61 BPM | TEMPERATURE: 97.9 F | WEIGHT: 203 LBS | OXYGEN SATURATION: 98 % | SYSTOLIC BLOOD PRESSURE: 149 MMHG

## 2025-04-28 DIAGNOSIS — K59.09 OTHER CONSTIPATION: Primary | ICD-10-CM

## 2025-04-28 LAB
APPEARANCE UR: ABNORMAL
BACTERIA URNS QL MICRO: ABNORMAL /HPF
BILIRUB UR QL: NEGATIVE
COLOR UR: ABNORMAL
EPITH CASTS URNS QL MICRO: ABNORMAL /LPF
GLUCOSE UR STRIP.AUTO-MCNC: NEGATIVE MG/DL
HCG UR QL: NEGATIVE
HGB UR QL STRIP: ABNORMAL
KETONES UR QL STRIP.AUTO: NEGATIVE MG/DL
LEUKOCYTE ESTERASE UR QL STRIP.AUTO: NEGATIVE
MUCOUS THREADS URNS QL MICRO: ABNORMAL /LPF
NITRITE UR QL STRIP.AUTO: NEGATIVE
PH UR STRIP: 6 (ref 5–8)
PROT UR STRIP-MCNC: NEGATIVE MG/DL
RBC #/AREA URNS HPF: ABNORMAL /HPF
SP GR UR REFRACTOMETRY: 1.02 (ref 1–1.03)
UROBILINOGEN UR QL STRIP.AUTO: 0.2 EU/DL (ref 0.2–1)
WBC URNS QL MICRO: ABNORMAL /HPF (ref 0–4)

## 2025-04-28 PROCEDURE — 74019 RADEX ABDOMEN 2 VIEWS: CPT

## 2025-04-28 PROCEDURE — 99284 EMERGENCY DEPT VISIT MOD MDM: CPT

## 2025-04-28 PROCEDURE — 81025 URINE PREGNANCY TEST: CPT

## 2025-04-28 PROCEDURE — 81001 URINALYSIS AUTO W/SCOPE: CPT

## 2025-04-28 ASSESSMENT — PAIN SCALES - GENERAL: PAINLEVEL_OUTOF10: 8

## 2025-04-28 ASSESSMENT — PAIN - FUNCTIONAL ASSESSMENT: PAIN_FUNCTIONAL_ASSESSMENT: 0-10
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
## 2025-06-10 ENCOUNTER — HOSPITAL ENCOUNTER (OUTPATIENT)
Facility: HOSPITAL | Age: 35
Setting detail: OBSERVATION
Discharge: LEFT BEFORE DISCHARGE | End: 2025-06-11
Attending: STUDENT IN AN ORGANIZED HEALTH CARE EDUCATION/TRAINING PROGRAM | Admitting: FAMILY MEDICINE
Payer: MEDICAID

## 2025-06-10 ENCOUNTER — APPOINTMENT (OUTPATIENT)
Facility: HOSPITAL | Age: 35
End: 2025-06-10
Payer: MEDICAID

## 2025-06-10 DIAGNOSIS — F40.240 CLAUSTROPHOBIA: ICD-10-CM

## 2025-06-10 DIAGNOSIS — R94.31 ST ELEVATION: ICD-10-CM

## 2025-06-10 DIAGNOSIS — I10 HYPERTENSION, UNSPECIFIED TYPE: ICD-10-CM

## 2025-06-10 DIAGNOSIS — R55 NEAR SYNCOPE: ICD-10-CM

## 2025-06-10 DIAGNOSIS — R55 SYNCOPE AND COLLAPSE: Primary | ICD-10-CM

## 2025-06-10 DIAGNOSIS — R00.2 PALPITATIONS: ICD-10-CM

## 2025-06-10 LAB
ALBUMIN SERPL-MCNC: 4.1 G/DL (ref 3.5–5.2)
ALBUMIN/GLOB SERPL: 1.5 (ref 1.1–2.2)
ALP SERPL-CCNC: 77 U/L (ref 35–104)
ALT SERPL-CCNC: 12 U/L (ref 10–35)
ANION GAP SERPL CALC-SCNC: 12 MMOL/L (ref 2–12)
AST SERPL-CCNC: 19 U/L (ref 10–35)
BASOPHILS # BLD: 0.09 K/UL (ref 0–0.1)
BASOPHILS NFR BLD: 0.8 % (ref 0–1)
BILIRUB SERPL-MCNC: 0.4 MG/DL (ref 0.2–1)
BUN SERPL-MCNC: 13 MG/DL (ref 6–20)
BUN/CREAT SERPL: 19 (ref 12–20)
CALCIUM SERPL-MCNC: 9 MG/DL (ref 8.6–10)
CHLORIDE SERPL-SCNC: 106 MMOL/L (ref 98–107)
CO2 SERPL-SCNC: 22 MMOL/L (ref 22–29)
CREAT SERPL-MCNC: 0.67 MG/DL (ref 0.5–0.9)
D DIMER PPP FEU-MCNC: <0.27 UG/ML(FEU)
DIFFERENTIAL METHOD BLD: ABNORMAL
EOSINOPHIL # BLD: 0.25 K/UL (ref 0–0.4)
EOSINOPHIL NFR BLD: 2.2 % (ref 0–7)
ERYTHROCYTE [DISTWIDTH] IN BLOOD BY AUTOMATED COUNT: 12.7 % (ref 11.5–14.5)
GLOBULIN SER CALC-MCNC: 2.8 G/DL (ref 2–4)
GLUCOSE SERPL-MCNC: 155 MG/DL (ref 65–100)
HCT VFR BLD AUTO: 40.7 % (ref 35–47)
HGB BLD-MCNC: 14 G/DL (ref 11.5–16)
IMM GRANULOCYTES # BLD AUTO: 0.08 K/UL (ref 0–0.04)
IMM GRANULOCYTES NFR BLD AUTO: 0.7 % (ref 0–0.5)
LYMPHOCYTES # BLD: 4.23 K/UL (ref 0.8–3.5)
LYMPHOCYTES NFR BLD: 38 % (ref 12–49)
MCH RBC QN AUTO: 30.2 PG (ref 26–34)
MCHC RBC AUTO-ENTMCNC: 34.4 G/DL (ref 30–36.5)
MCV RBC AUTO: 87.9 FL (ref 80–99)
MONOCYTES # BLD: 0.58 K/UL (ref 0–1)
MONOCYTES NFR BLD: 5.2 % (ref 5–13)
NEUTS SEG # BLD: 5.89 K/UL (ref 1.8–8)
NEUTS SEG NFR BLD: 53.1 % (ref 32–75)
NRBC # BLD: 0 K/UL (ref 0–0.01)
NRBC BLD-RTO: 0 PER 100 WBC
PLATELET # BLD AUTO: 318 K/UL (ref 150–400)
PMV BLD AUTO: 9.3 FL (ref 8.9–12.9)
POTASSIUM SERPL-SCNC: 3.2 MMOL/L (ref 3.5–5.1)
PROT SERPL-MCNC: 6.9 G/DL (ref 6.4–8.3)
RBC # BLD AUTO: 4.63 M/UL (ref 3.8–5.2)
SODIUM SERPL-SCNC: 140 MMOL/L (ref 136–145)
TROPONIN T SERPL HS-MCNC: 8.6 NG/L (ref 0–14)
TROPONIN T SERPL HS-MCNC: 9.1 NG/L (ref 0–14)
WBC # BLD AUTO: 11.1 K/UL (ref 3.6–11)

## 2025-06-10 PROCEDURE — 84484 ASSAY OF TROPONIN QUANT: CPT

## 2025-06-10 PROCEDURE — 71046 X-RAY EXAM CHEST 2 VIEWS: CPT

## 2025-06-10 PROCEDURE — 80053 COMPREHEN METABOLIC PANEL: CPT

## 2025-06-10 PROCEDURE — 99285 EMERGENCY DEPT VISIT HI MDM: CPT

## 2025-06-10 PROCEDURE — G0378 HOSPITAL OBSERVATION PER HR: HCPCS

## 2025-06-10 PROCEDURE — 36415 COLL VENOUS BLD VENIPUNCTURE: CPT

## 2025-06-10 PROCEDURE — 93005 ELECTROCARDIOGRAM TRACING: CPT | Performed by: STUDENT IN AN ORGANIZED HEALTH CARE EDUCATION/TRAINING PROGRAM

## 2025-06-10 PROCEDURE — 82962 GLUCOSE BLOOD TEST: CPT

## 2025-06-10 PROCEDURE — 85025 COMPLETE CBC W/AUTO DIFF WBC: CPT

## 2025-06-10 PROCEDURE — 85379 FIBRIN DEGRADATION QUANT: CPT

## 2025-06-10 PROCEDURE — 6370000000 HC RX 637 (ALT 250 FOR IP): Performed by: STUDENT IN AN ORGANIZED HEALTH CARE EDUCATION/TRAINING PROGRAM

## 2025-06-10 RX ORDER — POTASSIUM CHLORIDE 750 MG/1
40 TABLET, EXTENDED RELEASE ORAL ONCE
Status: COMPLETED | OUTPATIENT
Start: 2025-06-10 | End: 2025-06-10

## 2025-06-10 RX ORDER — SODIUM CHLORIDE 0.9 % (FLUSH) 0.9 %
5-40 SYRINGE (ML) INJECTION EVERY 12 HOURS SCHEDULED
Status: DISCONTINUED | OUTPATIENT
Start: 2025-06-10 | End: 2025-06-11 | Stop reason: HOSPADM

## 2025-06-10 RX ORDER — ACETAMINOPHEN 325 MG/1
650 TABLET ORAL EVERY 6 HOURS PRN
Status: DISCONTINUED | OUTPATIENT
Start: 2025-06-10 | End: 2025-06-11 | Stop reason: HOSPADM

## 2025-06-10 RX ORDER — POTASSIUM CHLORIDE 750 MG/1
40 TABLET, EXTENDED RELEASE ORAL PRN
Status: DISCONTINUED | OUTPATIENT
Start: 2025-06-10 | End: 2025-06-11 | Stop reason: HOSPADM

## 2025-06-10 RX ORDER — ONDANSETRON 2 MG/ML
4 INJECTION INTRAMUSCULAR; INTRAVENOUS EVERY 6 HOURS PRN
Status: DISCONTINUED | OUTPATIENT
Start: 2025-06-10 | End: 2025-06-11 | Stop reason: HOSPADM

## 2025-06-10 RX ORDER — ACETAMINOPHEN 650 MG/1
650 SUPPOSITORY RECTAL EVERY 6 HOURS PRN
Status: DISCONTINUED | OUTPATIENT
Start: 2025-06-10 | End: 2025-06-11 | Stop reason: HOSPADM

## 2025-06-10 RX ORDER — SODIUM CHLORIDE 0.9 % (FLUSH) 0.9 %
5-40 SYRINGE (ML) INJECTION PRN
Status: DISCONTINUED | OUTPATIENT
Start: 2025-06-10 | End: 2025-06-11 | Stop reason: HOSPADM

## 2025-06-10 RX ORDER — POTASSIUM CHLORIDE 7.45 MG/ML
10 INJECTION INTRAVENOUS PRN
Status: DISCONTINUED | OUTPATIENT
Start: 2025-06-10 | End: 2025-06-11 | Stop reason: HOSPADM

## 2025-06-10 RX ORDER — ASPIRIN 81 MG/1
324 TABLET, CHEWABLE ORAL ONCE
Status: COMPLETED | OUTPATIENT
Start: 2025-06-10 | End: 2025-06-10

## 2025-06-10 RX ORDER — ASPIRIN 81 MG/1
81 TABLET, CHEWABLE ORAL DAILY
Status: DISCONTINUED | OUTPATIENT
Start: 2025-06-11 | End: 2025-06-11 | Stop reason: HOSPADM

## 2025-06-10 RX ORDER — POLYETHYLENE GLYCOL 3350 17 G/17G
17 POWDER, FOR SOLUTION ORAL DAILY PRN
Status: DISCONTINUED | OUTPATIENT
Start: 2025-06-10 | End: 2025-06-11 | Stop reason: HOSPADM

## 2025-06-10 RX ORDER — ONDANSETRON 4 MG/1
4 TABLET, ORALLY DISINTEGRATING ORAL EVERY 8 HOURS PRN
Status: DISCONTINUED | OUTPATIENT
Start: 2025-06-10 | End: 2025-06-11 | Stop reason: HOSPADM

## 2025-06-10 RX ORDER — MAGNESIUM SULFATE IN WATER 40 MG/ML
2000 INJECTION, SOLUTION INTRAVENOUS PRN
Status: DISCONTINUED | OUTPATIENT
Start: 2025-06-10 | End: 2025-06-11 | Stop reason: HOSPADM

## 2025-06-10 RX ORDER — SODIUM CHLORIDE 9 MG/ML
INJECTION, SOLUTION INTRAVENOUS PRN
Status: DISCONTINUED | OUTPATIENT
Start: 2025-06-10 | End: 2025-06-11 | Stop reason: HOSPADM

## 2025-06-10 RX ADMIN — ASPIRIN 324 MG: 81 TABLET, CHEWABLE ORAL at 16:47

## 2025-06-10 RX ADMIN — POTASSIUM CHLORIDE 40 MEQ: 750 TABLET, FILM COATED, EXTENDED RELEASE ORAL at 17:22

## 2025-06-10 ASSESSMENT — LIFESTYLE VARIABLES
HOW OFTEN DO YOU HAVE A DRINK CONTAINING ALCOHOL: PATIENT DECLINED
HOW MANY STANDARD DRINKS CONTAINING ALCOHOL DO YOU HAVE ON A TYPICAL DAY: PATIENT DECLINED

## 2025-06-10 ASSESSMENT — PAIN - FUNCTIONAL ASSESSMENT: PAIN_FUNCTIONAL_ASSESSMENT: NONE - DENIES PAIN
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
## 2025-06-11 ENCOUNTER — HOSPITAL ENCOUNTER (EMERGENCY)
Facility: HOSPITAL | Age: 35
Discharge: HOME | End: 2025-06-11
Attending: EMERGENCY MEDICINE
Payer: MEDICAID

## 2025-06-11 ENCOUNTER — APPOINTMENT (OUTPATIENT)
Dept: VASCULAR SURGERY | Facility: HOSPITAL | Age: 35
End: 2025-06-11
Attending: PSYCHIATRY & NEUROLOGY
Payer: MEDICAID

## 2025-06-11 ENCOUNTER — APPOINTMENT (OUTPATIENT)
Facility: HOSPITAL | Age: 35
End: 2025-06-11
Payer: MEDICAID

## 2025-06-11 ENCOUNTER — CLINICAL DOCUMENTATION (OUTPATIENT)
Facility: HOSPITAL | Age: 35
End: 2025-06-11

## 2025-06-11 ENCOUNTER — APPOINTMENT (OUTPATIENT)
Facility: HOSPITAL | Age: 35
End: 2025-06-11
Attending: FAMILY MEDICINE
Payer: MEDICAID

## 2025-06-11 VITALS
OXYGEN SATURATION: 99 % | DIASTOLIC BLOOD PRESSURE: 103 MMHG | HEART RATE: 99 BPM | SYSTOLIC BLOOD PRESSURE: 154 MMHG | RESPIRATION RATE: 18 BRPM | TEMPERATURE: 97.9 F

## 2025-06-11 VITALS
HEART RATE: 87 BPM | WEIGHT: 203 LBS | SYSTOLIC BLOOD PRESSURE: 135 MMHG | TEMPERATURE: 97.5 F | RESPIRATION RATE: 17 BRPM | DIASTOLIC BLOOD PRESSURE: 93 MMHG | BODY MASS INDEX: 34.66 KG/M2 | HEIGHT: 64 IN | OXYGEN SATURATION: 99 %

## 2025-06-11 DIAGNOSIS — R00.2 PALPITATIONS: Primary | ICD-10-CM

## 2025-06-11 PROBLEM — E44.0 MODERATE PROTEIN-CALORIE MALNUTRITION: Status: ACTIVE | Noted: 2025-06-11

## 2025-06-11 LAB
ALBUMIN SERPL-MCNC: 3.3 G/DL (ref 3.5–5)
ALBUMIN SERPL-MCNC: 3.5 G/DL (ref 3.5–5)
ALBUMIN/GLOB SERPL: 1 (ref 1.1–2.2)
ALBUMIN/GLOB SERPL: 1.2 (ref 1.1–2.2)
ALP SERPL-CCNC: 83 U/L (ref 45–117)
ALP SERPL-CCNC: 83 U/L (ref 45–117)
ALT SERPL-CCNC: 17 U/L (ref 12–78)
ALT SERPL-CCNC: 18 U/L (ref 12–78)
AMPHET UR QL SCN: NEGATIVE
ANION GAP SERPL CALC-SCNC: 6 MMOL/L (ref 2–12)
ANION GAP SERPL CALC-SCNC: 6 MMOL/L (ref 2–12)
APPEARANCE UR: CLEAR
AST SERPL-CCNC: 13 U/L (ref 15–37)
AST SERPL-CCNC: 14 U/L (ref 15–37)
B PERT DNA SPEC QL NAA+PROBE: NOT DETECTED
BACTERIA URNS QL MICRO: ABNORMAL /HPF
BARBITURATES UR QL SCN: NEGATIVE
BASOPHILS # BLD: 0.11 K/UL (ref 0–0.1)
BASOPHILS # BLD: 0.12 K/UL (ref 0–0.1)
BASOPHILS NFR BLD: 0.8 % (ref 0–1)
BASOPHILS NFR BLD: 1.1 % (ref 0–1)
BENZODIAZ UR QL: NEGATIVE
BILIRUB SERPL-MCNC: 0.2 MG/DL (ref 0.2–1)
BILIRUB SERPL-MCNC: 0.5 MG/DL (ref 0.2–1)
BILIRUB UR QL: NEGATIVE
BORDETELLA PARAPERTUSSIS BY PCR: NOT DETECTED
BUN SERPL-MCNC: 17 MG/DL (ref 6–20)
BUN SERPL-MCNC: 20 MG/DL (ref 6–20)
BUN/CREAT SERPL: 22 (ref 12–20)
BUN/CREAT SERPL: 25 (ref 12–20)
C PNEUM DNA SPEC QL NAA+PROBE: NOT DETECTED
CALCIUM SERPL-MCNC: 8.9 MG/DL (ref 8.5–10.1)
CALCIUM SERPL-MCNC: 9.2 MG/DL (ref 8.5–10.1)
CANNABINOIDS UR QL SCN: NEGATIVE
CHLORIDE SERPL-SCNC: 111 MMOL/L (ref 97–108)
CHLORIDE SERPL-SCNC: 114 MMOL/L (ref 97–108)
CHOLEST SERPL-MCNC: 164 MG/DL
CO2 SERPL-SCNC: 22 MMOL/L (ref 21–32)
CO2 SERPL-SCNC: 23 MMOL/L (ref 21–32)
COCAINE UR QL SCN: NEGATIVE
COLOR UR: ABNORMAL
CREAT SERPL-MCNC: 0.69 MG/DL (ref 0.55–1.02)
CREAT SERPL-MCNC: 0.93 MG/DL (ref 0.55–1.02)
D DIMER PPP FEU-MCNC: 0.36 MG/L FEU (ref 0–0.65)
DIFFERENTIAL METHOD BLD: ABNORMAL
DIFFERENTIAL METHOD BLD: ABNORMAL
ECHO AO ASC DIAM: 2.7 CM
ECHO AO ASCENDING AORTA INDEX: 1.37 CM/M2
ECHO AV AREA PEAK VELOCITY: 3.4 CM2
ECHO AV AREA VTI: 3.4 CM2
ECHO AV AREA/BSA PEAK VELOCITY: 1.7 CM2/M2
ECHO AV AREA/BSA VTI: 1.7 CM2/M2
ECHO AV MEAN GRADIENT: 8 MMHG
ECHO AV MEAN VELOCITY: 1.3 M/S
ECHO AV PEAK GRADIENT: 15 MMHG
ECHO AV PEAK VELOCITY: 1.9 M/S
ECHO AV VELOCITY RATIO: 1.16
ECHO AV VTI: 35.2 CM
ECHO BSA: 2.04 M2
ECHO LA DIAMETER INDEX: 1.47 CM/M2
ECHO LA DIAMETER: 2.9 CM
ECHO LV E' LATERAL VELOCITY: 5.46 CM/S
ECHO LV E' SEPTAL VELOCITY: 5.43 CM/S
ECHO LV EDV A2C: 71 ML
ECHO LV EDV A4C: 86 ML
ECHO LV EDV BP: 79 ML (ref 56–104)
ECHO LV EDV INDEX A4C: 44 ML/M2
ECHO LV EDV INDEX BP: 40 ML/M2
ECHO LV EDV NDEX A2C: 36 ML/M2
ECHO LV EF PHYSICIAN: 78 %
ECHO LV EJECTION FRACTION A2C: 79 %
ECHO LV EJECTION FRACTION A4C: 78 %
ECHO LV EJECTION FRACTION BIPLANE: 78 % (ref 55–100)
ECHO LV ESV A2C: 15 ML
ECHO LV ESV A4C: 19 ML
ECHO LV ESV BP: 17 ML (ref 19–49)
ECHO LV ESV INDEX A2C: 8 ML/M2
ECHO LV ESV INDEX A4C: 10 ML/M2
ECHO LV ESV INDEX BP: 9 ML/M2
ECHO LV FRACTIONAL SHORTENING: 36 % (ref 28–44)
ECHO LV INTERNAL DIMENSION DIASTOLE INDEX: 1.98 CM/M2
ECHO LV INTERNAL DIMENSION DIASTOLIC: 3.9 CM (ref 3.9–5.3)
ECHO LV INTERNAL DIMENSION SYSTOLIC INDEX: 1.27 CM/M2
ECHO LV INTERNAL DIMENSION SYSTOLIC: 2.5 CM
ECHO LV IVSD: 1.8 CM (ref 0.6–0.9)
ECHO LV MASS 2D: 302.2 G (ref 67–162)
ECHO LV MASS INDEX 2D: 153.4 G/M2 (ref 43–95)
ECHO LV POSTERIOR WALL DIASTOLIC: 1.8 CM (ref 0.6–0.9)
ECHO LV RELATIVE WALL THICKNESS RATIO: 0.92
ECHO LVOT AREA: 3.1 CM2
ECHO LVOT AV VTI INDEX: 1.13
ECHO LVOT DIAM: 2 CM
ECHO LVOT MEAN GRADIENT: 9 MMHG
ECHO LVOT PEAK GRADIENT: 19 MMHG
ECHO LVOT PEAK VELOCITY: 2.2 M/S
ECHO LVOT STROKE VOLUME INDEX: 63.4 ML/M2
ECHO LVOT SV: 125 ML
ECHO LVOT VTI: 39.8 CM
ECHO MV A VELOCITY: 0.77 M/S
ECHO MV E DECELERATION TIME (DT): 275.4 MS
ECHO MV E VELOCITY: 0.69 M/S
ECHO MV E/A RATIO: 0.9
ECHO MV E/E' LATERAL: 12.64
ECHO MV E/E' RATIO (AVERAGED): 12.67
ECHO MV E/E' SEPTAL: 12.71
ECHO PV MAX VELOCITY: 1.4 M/S
ECHO PV PEAK GRADIENT: 8 MMHG
ECHO RV FREE WALL PEAK S': 14.2 CM/S
ECHO RV INTERNAL DIMENSION: 2.5 CM
ECHO RV TAPSE: 2.3 CM (ref 1.7–?)
ECHO RVOT MEAN GRADIENT: 3 MMHG
ECHO RVOT PEAK GRADIENT: 7 MMHG
ECHO RVOT PEAK VELOCITY: 1.3 M/S
ECHO RVOT VTI: 24.3 CM
EKG ATRIAL RATE: 84 BPM
EKG ATRIAL RATE: 88 BPM
EKG DIAGNOSIS: NORMAL
EKG DIAGNOSIS: NORMAL
EKG P AXIS: 41 DEGREES
EKG P AXIS: 8 DEGREES
EKG P-R INTERVAL: 162 MS
EKG P-R INTERVAL: 168 MS
EKG Q-T INTERVAL: 350 MS
EKG Q-T INTERVAL: 358 MS
EKG QRS DURATION: 90 MS
EKG QRS DURATION: 92 MS
EKG QTC CALCULATION (BAZETT): 423 MS
EKG QTC CALCULATION (BAZETT): 423 MS
EKG R AXIS: 26 DEGREES
EKG R AXIS: 27 DEGREES
EKG T AXIS: -78 DEGREES
EKG T AXIS: 143 DEGREES
EKG VENTRICULAR RATE: 84 BPM
EKG VENTRICULAR RATE: 88 BPM
EOSINOPHIL # BLD: 0.39 K/UL (ref 0–0.4)
EOSINOPHIL # BLD: 0.41 K/UL (ref 0–0.4)
EOSINOPHIL NFR BLD: 3.1 % (ref 0–7)
EOSINOPHIL NFR BLD: 3.4 % (ref 0–7)
EPITH CASTS URNS QL MICRO: ABNORMAL /LPF
ERYTHROCYTE [DISTWIDTH] IN BLOOD BY AUTOMATED COUNT: 12.8 % (ref 11.5–14.5)
ERYTHROCYTE [DISTWIDTH] IN BLOOD BY AUTOMATED COUNT: 12.8 % (ref 11.5–14.5)
EST. AVERAGE GLUCOSE BLD GHB EST-MCNC: 103 MG/DL
FLUAV SUBTYP SPEC NAA+PROBE: NOT DETECTED
FLUBV RNA SPEC QL NAA+PROBE: NOT DETECTED
GLOBULIN SER CALC-MCNC: 2.7 G/DL (ref 2–4)
GLOBULIN SER CALC-MCNC: 3.5 G/DL (ref 2–4)
GLUCOSE SERPL-MCNC: 98 MG/DL (ref 65–100)
GLUCOSE SERPL-MCNC: 98 MG/DL (ref 65–100)
GLUCOSE UR STRIP.AUTO-MCNC: NEGATIVE MG/DL
HADV DNA SPEC QL NAA+PROBE: NOT DETECTED
HBA1C MFR BLD: 5.2 % (ref 4–5.6)
HCG SERPL QL: NEGATIVE
HCG UR QL: NEGATIVE
HCOV 229E RNA SPEC QL NAA+PROBE: NOT DETECTED
HCOV HKU1 RNA SPEC QL NAA+PROBE: NOT DETECTED
HCOV NL63 RNA SPEC QL NAA+PROBE: NOT DETECTED
HCOV OC43 RNA SPEC QL NAA+PROBE: NOT DETECTED
HCT VFR BLD AUTO: 41.9 % (ref 35–47)
HCT VFR BLD AUTO: 43.9 % (ref 35–47)
HDLC SERPL-MCNC: 36 MG/DL
HDLC SERPL: 4.6 (ref 0–5)
HGB BLD-MCNC: 14.1 G/DL (ref 11.5–16)
HGB BLD-MCNC: 14.5 G/DL (ref 11.5–16)
HGB UR QL STRIP: ABNORMAL
HMPV RNA SPEC QL NAA+PROBE: NOT DETECTED
HPIV1 RNA SPEC QL NAA+PROBE: NOT DETECTED
HPIV2 RNA SPEC QL NAA+PROBE: NOT DETECTED
HPIV3 RNA SPEC QL NAA+PROBE: NOT DETECTED
HPIV4 RNA SPEC QL NAA+PROBE: NOT DETECTED
HYALINE CASTS URNS QL MICRO: ABNORMAL /LPF (ref 0–2)
IMM GRANULOCYTES # BLD AUTO: 0.07 K/UL (ref 0–0.04)
IMM GRANULOCYTES # BLD AUTO: 0.08 K/UL (ref 0–0.04)
IMM GRANULOCYTES NFR BLD AUTO: 0.6 % (ref 0–0.5)
IMM GRANULOCYTES NFR BLD AUTO: 0.6 % (ref 0–0.5)
KETONES UR QL STRIP.AUTO: NEGATIVE MG/DL
LDLC SERPL CALC-MCNC: 94.6 MG/DL (ref 0–100)
LEUKOCYTE ESTERASE UR QL STRIP.AUTO: NEGATIVE
LYMPHOCYTES # BLD: 4.87 K/UL (ref 0.8–3.5)
LYMPHOCYTES # BLD: 5.59 K/UL (ref 0.8–3.5)
LYMPHOCYTES NFR BLD: 42 % (ref 12–49)
LYMPHOCYTES NFR BLD: 42.8 % (ref 12–49)
Lab: NORMAL
M PNEUMO DNA SPEC QL NAA+PROBE: NOT DETECTED
MAGNESIUM SERPL-MCNC: 2.3 MG/DL (ref 1.6–2.4)
MCH RBC QN AUTO: 29.9 PG (ref 26–34)
MCH RBC QN AUTO: 30.1 PG (ref 26–34)
MCHC RBC AUTO-ENTMCNC: 33 G/DL (ref 30–36.5)
MCHC RBC AUTO-ENTMCNC: 33.7 G/DL (ref 30–36.5)
MCV RBC AUTO: 89 FL (ref 80–99)
MCV RBC AUTO: 91.1 FL (ref 80–99)
METHADONE UR QL: NEGATIVE
MONOCYTES # BLD: 0.71 K/UL (ref 0–1)
MONOCYTES # BLD: 0.83 K/UL (ref 0–1)
MONOCYTES NFR BLD: 6.2 % (ref 5–13)
MONOCYTES NFR BLD: 6.2 % (ref 5–13)
NEUTS SEG # BLD: 5.22 K/UL (ref 1.8–8)
NEUTS SEG # BLD: 6.29 K/UL (ref 1.8–8)
NEUTS SEG NFR BLD: 45.9 % (ref 32–75)
NEUTS SEG NFR BLD: 47.3 % (ref 32–75)
NITRITE UR QL STRIP.AUTO: NEGATIVE
NRBC # BLD: 0 K/UL (ref 0–0.01)
NRBC # BLD: 0 K/UL (ref 0–0.01)
NRBC BLD-RTO: 0 PER 100 WBC
NRBC BLD-RTO: 0 PER 100 WBC
OPIATES UR QL: NEGATIVE
PCP UR QL: NEGATIVE
PH UR STRIP: 6.5 (ref 5–8)
PLATELET # BLD AUTO: 314 K/UL (ref 150–400)
PLATELET # BLD AUTO: 328 K/UL (ref 150–400)
PMV BLD AUTO: 9.6 FL (ref 8.9–12.9)
PMV BLD AUTO: 9.8 FL (ref 8.9–12.9)
POTASSIUM SERPL-SCNC: 4 MMOL/L (ref 3.5–5.1)
POTASSIUM SERPL-SCNC: 4.1 MMOL/L (ref 3.5–5.1)
PROT SERPL-MCNC: 6 G/DL (ref 6.4–8.2)
PROT SERPL-MCNC: 7 G/DL (ref 6.4–8.2)
PROT UR STRIP-MCNC: NEGATIVE MG/DL
RBC # BLD AUTO: 4.71 M/UL (ref 3.8–5.2)
RBC # BLD AUTO: 4.82 M/UL (ref 3.8–5.2)
RBC #/AREA URNS HPF: ABNORMAL /HPF (ref 0–5)
RSV RNA SPEC QL NAA+PROBE: NOT DETECTED
RV+EV RNA SPEC QL NAA+PROBE: NOT DETECTED
SARS-COV-2 RNA RESP QL NAA+PROBE: NOT DETECTED
SODIUM SERPL-SCNC: 140 MMOL/L (ref 136–145)
SODIUM SERPL-SCNC: 142 MMOL/L (ref 136–145)
SP GR UR REFRACTOMETRY: 1.02 (ref 1–1.03)
TRIGL SERPL-MCNC: 167 MG/DL
TROPONIN I SERPL HS-MCNC: 26 NG/L (ref 0–51)
TROPONIN I SERPL HS-MCNC: 27 NG/L (ref 0–51)
TSH SERPL DL<=0.05 MIU/L-ACNC: 3.26 UIU/ML (ref 0.36–3.74)
URINE CULTURE IF INDICATED: ABNORMAL
UROBILINOGEN UR QL STRIP.AUTO: 1 EU/DL (ref 0.2–1)
VLDLC SERPL CALC-MCNC: 33.4 MG/DL
WBC # BLD AUTO: 11.4 K/UL (ref 3.6–11)
WBC # BLD AUTO: 13.3 K/UL (ref 3.6–11)
WBC URNS QL MICRO: ABNORMAL /HPF (ref 0–4)

## 2025-06-11 PROCEDURE — 80307 DRUG TEST PRSMV CHEM ANLYZR: CPT

## 2025-06-11 PROCEDURE — 84443 ASSAY THYROID STIM HORMONE: CPT

## 2025-06-11 PROCEDURE — 6360000004 HC RX CONTRAST MEDICATION: Performed by: SPECIALIST

## 2025-06-11 PROCEDURE — 84703 CHORIONIC GONADOTROPIN ASSAY: CPT

## 2025-06-11 PROCEDURE — 85025 COMPLETE CBC W/AUTO DIFF WBC: CPT

## 2025-06-11 PROCEDURE — 0202U NFCT DS 22 TRGT SARS-COV-2: CPT

## 2025-06-11 PROCEDURE — C8929 TTE W OR WO FOL WCON,DOPPLER: HCPCS

## 2025-06-11 PROCEDURE — 83036 HEMOGLOBIN GLYCOSYLATED A1C: CPT

## 2025-06-11 PROCEDURE — 2500000003 HC RX 250 WO HCPCS: Performed by: FAMILY MEDICINE

## 2025-06-11 PROCEDURE — G0378 HOSPITAL OBSERVATION PER HR: HCPCS

## 2025-06-11 PROCEDURE — 93005 ELECTROCARDIOGRAM TRACING: CPT | Performed by: EMERGENCY MEDICINE

## 2025-06-11 PROCEDURE — 81025 URINE PREGNANCY TEST: CPT

## 2025-06-11 PROCEDURE — 95816 EEG AWAKE AND DROWSY: CPT

## 2025-06-11 PROCEDURE — 93306 TTE W/DOPPLER COMPLETE: CPT | Performed by: STUDENT IN AN ORGANIZED HEALTH CARE EDUCATION/TRAINING PROGRAM

## 2025-06-11 PROCEDURE — 80053 COMPREHEN METABOLIC PANEL: CPT

## 2025-06-11 PROCEDURE — 99223 1ST HOSP IP/OBS HIGH 75: CPT | Performed by: SPECIALIST

## 2025-06-11 PROCEDURE — 80061 LIPID PANEL: CPT

## 2025-06-11 PROCEDURE — 94761 N-INVAS EAR/PLS OXIMETRY MLT: CPT

## 2025-06-11 PROCEDURE — 6370000000 HC RX 637 (ALT 250 FOR IP): Performed by: FAMILY MEDICINE

## 2025-06-11 PROCEDURE — 81001 URINALYSIS AUTO W/SCOPE: CPT

## 2025-06-11 PROCEDURE — 95957 EEG DIGITAL ANALYSIS: CPT

## 2025-06-11 PROCEDURE — 85379 FIBRIN DEGRADATION QUANT: CPT

## 2025-06-11 PROCEDURE — 36415 COLL VENOUS BLD VENIPUNCTURE: CPT

## 2025-06-11 PROCEDURE — 83735 ASSAY OF MAGNESIUM: CPT

## 2025-06-11 PROCEDURE — 93880 EXTRACRANIAL BILAT STUDY: CPT

## 2025-06-11 PROCEDURE — 93010 ELECTROCARDIOGRAM REPORT: CPT | Performed by: SPECIALIST

## 2025-06-11 PROCEDURE — 84484 ASSAY OF TROPONIN QUANT: CPT

## 2025-06-11 RX ORDER — DIAZEPAM 10 MG/2ML
5 INJECTION, SOLUTION INTRAMUSCULAR; INTRAVENOUS
Status: DISCONTINUED | OUTPATIENT
Start: 2025-06-11 | End: 2025-06-11 | Stop reason: HOSPADM

## 2025-06-11 RX ADMIN — SODIUM CHLORIDE, PRESERVATIVE FREE 10 ML: 5 INJECTION INTRAVENOUS at 08:37

## 2025-06-11 RX ADMIN — ASPIRIN 81 MG: 81 TABLET, CHEWABLE ORAL at 08:37

## 2025-06-11 RX ADMIN — SULFUR HEXAFLUORIDE 2 ML: KIT at 13:06

## 2025-06-11 ASSESSMENT — PAIN SCALES - GENERAL: PAINLEVEL_OUTOF10: 6

## 2025-06-11 ASSESSMENT — PAIN DESCRIPTION - LOCATION: LOCATION: ABDOMEN

## 2025-06-11 ASSESSMENT — PAIN - FUNCTIONAL ASSESSMENT: PAIN_FUNCTIONAL_ASSESSMENT: 0-10

## 2025-06-11 ASSESSMENT — PAIN DESCRIPTION - DESCRIPTORS: DESCRIPTORS: CRAMPING
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
## 2025-06-11 NOTE — H&P
Hospitalist Admission Note    NAME:  Sandra Jorge   :  1990   MRN:  [id number]     Date/Time:  6/10/2025 9:17 PM    Patient PCP: None, None  ________________________________________________________________________    Given the patient's current clinical presentation, I have a high level of concern for decompensation if discharged from the emergency department.  Complex decision making was performed, which includes reviewing the patient's available past medical records, laboratory results, and x-ray films.       My assessment of this patient's clinical condition and my plan of care is as follows.    Assessment / Plan:    Sandra is a 35 y.o.  female with PMHx asthma and interstitial lung disease who presents for syncope with palpitations.     # Syncope: Acute, present on admission.  Associated with palpitations.  Differential includes arrhythmia versus stress.  Possible gas exposure yesterday, which is associated with ventricular arrhythmias. ST elevation in V1 and mild ST depression in lateral leads.  Does not meet STEMI criteria. No chest pain or chest heaviness currently. Does have heart murmur.   - Telemetry  - Echocardiogram  - Repeat EKG tomorrow  - N.p.o. midnight in case cardiology wants a cath  - Troponin so far negative, will repeat 1 more.  - TSH  - Aspirin 81 mg starting tomorrow  --cardiology consult  --lipid panel  --A1c  --UDS  --NPO midnight  --orthostatic vitals    Hypokalemia: Acute, present on admission.  Status post treatment in ED.  Will recheck tomorrow with mag.     Gas exposure: Unsure if propane or natural gas.     Mildly elevated leukocytosis: CXR neg.  Will check UA and RVP    Elevated BP: Acute, present on admission.  No history of hypertension in the past.  May be related to stress of ER visit.  Will monitor for now as seems to be asymptomatic.    Concern for pregnancy: Patient has self-reported concern for pregnancy.  Will check serum hCG as it may be too early for a urine  critical care time.  During this entire length of time I was immediately available to the patient. The reason for providing this level of medical care was due to a critical illness that impaired one or more vital organ systems, such that there was a high probability of imminent or life threatening deterioration in the patient's condition. This care involved high complexity decision making which includes reviewing the patient's past medical records, current laboratory results, and sometimes reviewing actual Xray films in order to assess, support vital system function, and to treat this degree of vital organ system failure, and to prevent further life threatening deterioration of the patient’s condition.      ________________________________________________________________________  Signed: Zack Blackburn MD        Procedures: see electronic medical records for all procedures/Xrays/labs and details which were not copied into this note but were reviewed prior to creation of Plan.

## 2025-06-11 NOTE — CARE COORDINATION
6/11/2025  3:40 PM  CM met w/ pt, she stated she did not request to speak w/ CM.  Consult completed.  CALVIN Valenzuela      3:34 PM     06/11/25 1533   Service Assessment   Patient Orientation Alert and Oriented   Cognition Alert   Primary Caregiver Self   Discharge Planning   Type of Residence House   Patient expects to be discharged to: House   Services At/After Discharge   Mode of Transport at Discharge Other (see comment)  (Family)   Confirm Follow Up Transport Self     6/11/2025  3:35 PM      ICD-10-CM    1. Syncope and collapse  R55 EEG     EEG      2. Palpitations  R00.2 Extended cardiac holter monitor (3 day-14 day)     Extended cardiac holter monitor (3 day-14 day)      3. Hypertension, unspecified type  I10       4. ST elevation  R94.31 Echo (TTE) complete (PRN contrast/bubble/strain/3D)     Echo (TTE) complete (PRN contrast/bubble/strain/3D)      5. Near syncope  R55 Vascular duplex carotid bilateral     Vascular duplex carotid bilateral     MRI BRAIN W WO CONTRAST     MRI BRAIN W WO CONTRAST     CANCELED: MRI BRAIN W WO CONTRAST     CANCELED: MRI BRAIN W WO CONTRAST      6. Claustrophobia  F40.240 diazePAM (VALIUM) injection 5 mg          General Risk Score: 4   Values used to calculate this score:    Points  Metrics       0        Age: 35       1        Hospital Admissions: 1       3        ED Visits: 11       0        Has Chronic Obstructive Pulmonary Disease: No       0        Has Diabetes Excluding Gestational Diabetes: No       0        Has Congestive Heart Failure: No       0        Has Liver Disease: No       0        Has Depression: No       0        Current PCP: None None       0        Has Medicaid: No      Pt discussed in IDR, cardiology, neurology consults, ECHO. No CM needs indicated at this time. Per IDR, estimated discharge date is 6/12/25.   CM met w/ pt to confirm demographics  CM confirmed family will transport at DC   Consult for pt to meet w/ CM, US at

## 2025-06-11 NOTE — PLAN OF CARE
Problem: Discharge Planning  Goal: Discharge to home or other facility with appropriate resources  Outcome: Progressing  Flowsheets (Taken 6/10/2025 2030)  Discharge to home or other facility with appropriate resources: Identify barriers to discharge with patient and caregiver     Problem: Safety - Adult  Goal: Free from fall injury  Outcome: Progressing
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
## 2025-06-11 NOTE — PROGRESS NOTES
1726 - Patient eloped while waiting for EEG results. Security paged and Missing Adult Code called. MD Aware. Search for patient ongoing.     1741 - Security visualized patient getting into car and leaving hospital. MD Aware.
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
## 2025-06-11 NOTE — CONSULTS
Pollen(K-O-R-T-Swt Bill)     Shrimp (Diagnostic)     Tree Extract      MEDICATIONS    Current Facility-Administered Medications:     diazePAM (VALIUM) injection 5 mg, 5 mg, IntraVENous, Once PRN, Ari Styles MD    sodium chloride flush 0.9 % injection 5-40 mL, 5-40 mL, IntraVENous, 2 times per day, Zack Blackburn MD, 10 mL at 06/11/25 0837    sodium chloride flush 0.9 % injection 5-40 mL, 5-40 mL, IntraVENous, PRN, Zack Blackburn MD    0.9 % sodium chloride infusion, , IntraVENous, PRN, Zack Blackburn MD    potassium chloride (KLOR-CON) extended release tablet 40 mEq, 40 mEq, Oral, PRN **OR** potassium bicarb-citric acid (EFFER-K) effervescent tablet 40 mEq, 40 mEq, Oral, PRN **OR** potassium chloride 10 mEq/100 mL IVPB (Peripheral Line), 10 mEq, IntraVENous, PRN, Zack Blackburn MD    magnesium sulfate 2000 mg in 50 mL IVPB premix, 2,000 mg, IntraVENous, PRN, Zack Blackburn MD    ondansetron (ZOFRAN-ODT) disintegrating tablet 4 mg, 4 mg, Oral, Q8H PRN **OR** ondansetron (ZOFRAN) injection 4 mg, 4 mg, IntraVENous, Q6H PRN, Zack Blackburn MD    polyethylene glycol (GLYCOLAX) packet 17 g, 17 g, Oral, Daily PRN, Zack Blackburn MD    acetaminophen (TYLENOL) tablet 650 mg, 650 mg, Oral, Q6H PRN **OR** acetaminophen (TYLENOL) suppository 650 mg, 650 mg, Rectal, Q6H PRN, Zack Blackburn MD    aspirin chewable tablet 81 mg, 81 mg, Oral, Daily, Zack Blackburn MD, 81 mg at 06/11/25 0837    =====================================    MEDICAL DECISION MAKING (2 of 3: A +/- B +/- C)    A) Number and Complexity of Problem(s) Addressed:  [] 1 stable, acute illness (Low)  [] 1 stable, chronic illness (Low)  [] 1 acute illness with systemic symptoms (Moderate)  [] 1 undiagnosed new problem with uncertain prognosis (Moderate)  [] 1 or more chronic illness with exacerbation, progression, or side effects of treatment (Moderate)  [] 1 or more chronic illnesses with severe exacerbation, progression, or side effects of  treatment (High)  [x] 1 acute or chronic illness or injury that poses a threat to life or bodily function (High)    B) Amount/ Complexity of Data reviewed (1 of 3 categories = Moderate, 2 of 3 categories = High)    Test, documents, or independent historian(s) (any THREE ELEMENTS):   []  Review of external note(s) from unique source:  [x]  Review of the result(s) of each unique test  (3+)   [x]  Ordered a unique test EEG, MRI Brain, Carotid US  []  Discussed with a historian other than the patient:     Independent interpretation of a test performed by another Physician / QHP:     [] Yes      Discussion of management or test interpretation with another Physician / QHP:     [x] Yes  Discussed with MARE Singh/ Hospitalist via [employer] (secure messaging)       C) Risk of Complication, Morbidity, or Mortality:     Escalation of hospital level of care (High Risk):  [] Yes [] No    Decision to de-escalate care or DNR due to poor prognosis (High Risk):     [] Yes   [] No    Parenteral controlled substances prescribed (High Risk):     [] Yes [] No    Drug Therapy requiring intensive monitoring for toxicity (High Risk)  [] Yes   [] No    Prescription drug management (Moderate Risk):      [] Yes     [] No    Decision regarding minor surgery (Moderate Risk):   [] Yes     [] No

## 2025-06-11 NOTE — PLAN OF CARE
Patient came back to ED with IV still enact, I personally removed her IV. Patient stated that she \"just had to get out of there\" . She stated \" I need something to calm me down if go back\" . I explained to the patient that since she left, she would have to sign back in through the ED and be re-admitted. She then stated that she would be back \"after while\" to get registered. She was with her mother. I advised the patient to please not leave when she came back and explained the need to have her MRI and monitoring.

## 2025-06-11 NOTE — PROGRESS NOTES
EEG completed    This Routine EEG was performed in real-time by an EEG technologist.  Electrodes placed according to the 10-20 International System.  Standard sensitivity 7uV/mm and high filter 70 Hz and low filter 1 Hz settings were set at initiation of the recording and adjustments, as appropriate, and noted on the recording.  Baseline electrode impedances were at or below 10k Ohms.  Per protocol, this recording data is uploaded/transferred from the EEG equipment to a central storage location in real time.  Duration of this EEG was (25:24) minutes.  Photic stimulation was performed, and Hyperventilation was not performed.  Digital analysis is recorded using a software tool that analyzes EEG data to identify seizures called Persyst.
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
## 2025-06-12 ENCOUNTER — APPOINTMENT (OUTPATIENT)
Facility: HOSPITAL | Age: 35
End: 2025-06-12
Payer: MEDICAID

## 2025-06-12 ENCOUNTER — HOSPITAL ENCOUNTER (EMERGENCY)
Facility: HOSPITAL | Age: 35
Discharge: HOME | End: 2025-06-12
Attending: EMERGENCY MEDICINE
Payer: MEDICAID

## 2025-06-12 VITALS
WEIGHT: 203 LBS | OXYGEN SATURATION: 97 % | HEIGHT: 64 IN | SYSTOLIC BLOOD PRESSURE: 149 MMHG | DIASTOLIC BLOOD PRESSURE: 88 MMHG | BODY MASS INDEX: 34.66 KG/M2 | TEMPERATURE: 98.4 F | RESPIRATION RATE: 26 BRPM | HEART RATE: 66 BPM

## 2025-06-12 DIAGNOSIS — R07.9 CHEST PAIN, UNSPECIFIED TYPE: Primary | ICD-10-CM

## 2025-06-12 LAB
ALBUMIN SERPL-MCNC: 4.2 G/DL (ref 3.5–5.2)
ALBUMIN/GLOB SERPL: 1.8 (ref 1.1–2.2)
ALP SERPL-CCNC: 73 U/L (ref 35–104)
ALT SERPL-CCNC: 14 U/L (ref 10–35)
ANION GAP SERPL CALC-SCNC: 12 MMOL/L (ref 2–12)
AST SERPL-CCNC: 22 U/L (ref 10–35)
BASOPHILS # BLD: 0.1 K/UL (ref 0–0.1)
BASOPHILS NFR BLD: 1 % (ref 0–1)
BILIRUB SERPL-MCNC: 0.6 MG/DL (ref 0.2–1)
BUN SERPL-MCNC: 12 MG/DL (ref 6–20)
BUN/CREAT SERPL: 17 (ref 12–20)
CALCIUM SERPL-MCNC: 9.3 MG/DL (ref 8.6–10)
CHLORIDE SERPL-SCNC: 106 MMOL/L (ref 98–107)
CO2 SERPL-SCNC: 24 MMOL/L (ref 22–29)
CREAT SERPL-MCNC: 0.69 MG/DL (ref 0.5–0.9)
DIFFERENTIAL METHOD BLD: ABNORMAL
ECHO BSA: 2.04 M2
EKG ATRIAL RATE: 77 BPM
EKG DIAGNOSIS: NORMAL
EKG P AXIS: 38 DEGREES
EKG P-R INTERVAL: 160 MS
EKG Q-T INTERVAL: 374 MS
EKG QRS DURATION: 88 MS
EKG QTC CALCULATION (BAZETT): 423 MS
EKG R AXIS: 16 DEGREES
EKG T AXIS: 145 DEGREES
EKG VENTRICULAR RATE: 77 BPM
EOSINOPHIL # BLD: 0.24 K/UL (ref 0–0.4)
EOSINOPHIL NFR BLD: 2.3 % (ref 0–7)
ERYTHROCYTE [DISTWIDTH] IN BLOOD BY AUTOMATED COUNT: 12.8 % (ref 11.5–14.5)
GLOBULIN SER CALC-MCNC: 2.4 G/DL (ref 2–4)
GLUCOSE SERPL-MCNC: 94 MG/DL (ref 65–100)
HCT VFR BLD AUTO: 43.7 % (ref 35–47)
HGB BLD-MCNC: 14.9 G/DL (ref 11.5–16)
IMM GRANULOCYTES # BLD AUTO: 0.06 K/UL (ref 0–0.04)
IMM GRANULOCYTES NFR BLD AUTO: 0.6 % (ref 0–0.5)
LYMPHOCYTES # BLD: 4.14 K/UL (ref 0.8–3.5)
LYMPHOCYTES NFR BLD: 40.3 % (ref 12–49)
MCH RBC QN AUTO: 30.5 PG (ref 26–34)
MCHC RBC AUTO-ENTMCNC: 34.1 G/DL (ref 30–36.5)
MCV RBC AUTO: 89.4 FL (ref 80–99)
MONOCYTES # BLD: 0.47 K/UL (ref 0–1)
MONOCYTES NFR BLD: 4.6 % (ref 5–13)
NEUTS SEG # BLD: 5.26 K/UL (ref 1.8–8)
NEUTS SEG NFR BLD: 51.2 % (ref 32–75)
NRBC # BLD: 0 K/UL (ref 0–0.01)
NRBC BLD-RTO: 0 PER 100 WBC
PLATELET # BLD AUTO: 314 K/UL (ref 150–400)
PMV BLD AUTO: 9.7 FL (ref 8.9–12.9)
POTASSIUM SERPL-SCNC: 4.3 MMOL/L (ref 3.5–5.1)
PROT SERPL-MCNC: 6.6 G/DL (ref 6.4–8.3)
RBC # BLD AUTO: 4.89 M/UL (ref 3.8–5.2)
SODIUM SERPL-SCNC: 142 MMOL/L (ref 136–145)
TROPONIN T SERPL HS-MCNC: 7.8 NG/L (ref 0–14)
VAS LEFT CCA DIST EDV: 28.5 CM/S
VAS LEFT CCA DIST PSV: 121.5 CM/S
VAS LEFT CCA PROX EDV: 24.8 CM/S
VAS LEFT CCA PROX PSV: 130.6 CM/S
VAS LEFT ECA EDV: 17.5 CM/S
VAS LEFT ECA PSV: 123.3 CM/S
VAS LEFT ICA DIST EDV: 38.6 CM/S
VAS LEFT ICA DIST PSV: 96.6 CM/S
VAS LEFT ICA MID EDV: 33.1 CM/S
VAS LEFT ICA MID PSV: 79.1 CM/S
VAS LEFT ICA PROX EDV: 33.9 CM/S
VAS LEFT ICA PROX PSV: 106.9 CM/S
VAS LEFT ICA/CCA PSV: 0.9 NO UNITS
VAS LEFT SUBCLAVIAN PROX EDV: 0 CM/S
VAS LEFT SUBCLAVIAN PROX PSV: 149 CM/S
VAS LEFT VERTEBRAL EDV: 23 CM/S
VAS LEFT VERTEBRAL PSV: 65 CM/S
VAS RIGHT CCA DIST EDV: 21.2 CM/S
VAS RIGHT CCA DIST PSV: 108.8 CM/S
VAS RIGHT CCA PROX EDV: 17.5 CM/S
VAS RIGHT CCA PROX PSV: 116.2 CM/S
VAS RIGHT ECA EDV: 14.2 CM/S
VAS RIGHT ECA PSV: 84.3 CM/S
VAS RIGHT ICA DIST EDV: 37.5 CM/S
VAS RIGHT ICA DIST PSV: 92.8 CM/S
VAS RIGHT ICA MID EDV: 31.4 CM/S
VAS RIGHT ICA MID PSV: 75.6 CM/S
VAS RIGHT ICA PROX EDV: 26.5 CM/S
VAS RIGHT ICA PROX PSV: 83 CM/S
VAS RIGHT ICA/CCA PSV: 0.9 NO UNITS
VAS RIGHT SUBCLAVIAN PROX EDV: 0 CM/S
VAS RIGHT SUBCLAVIAN PROX PSV: 183.3 CM/S
VAS RIGHT VERTEBRAL EDV: 17.9 CM/S
VAS RIGHT VERTEBRAL PSV: 48.6 CM/S
WBC # BLD AUTO: 10.3 K/UL (ref 3.6–11)

## 2025-06-12 PROCEDURE — 80053 COMPREHEN METABOLIC PANEL: CPT

## 2025-06-12 PROCEDURE — 96375 TX/PRO/DX INJ NEW DRUG ADDON: CPT

## 2025-06-12 PROCEDURE — 93005 ELECTROCARDIOGRAM TRACING: CPT | Performed by: EMERGENCY MEDICINE

## 2025-06-12 PROCEDURE — 84484 ASSAY OF TROPONIN QUANT: CPT

## 2025-06-12 PROCEDURE — 36415 COLL VENOUS BLD VENIPUNCTURE: CPT

## 2025-06-12 PROCEDURE — 71045 X-RAY EXAM CHEST 1 VIEW: CPT

## 2025-06-12 PROCEDURE — 6370000000 HC RX 637 (ALT 250 FOR IP): Performed by: EMERGENCY MEDICINE

## 2025-06-12 PROCEDURE — 6360000002 HC RX W HCPCS: Performed by: EMERGENCY MEDICINE

## 2025-06-12 PROCEDURE — 85025 COMPLETE CBC W/AUTO DIFF WBC: CPT

## 2025-06-12 PROCEDURE — 99285 EMERGENCY DEPT VISIT HI MDM: CPT

## 2025-06-12 PROCEDURE — 93010 ELECTROCARDIOGRAM REPORT: CPT | Performed by: SPECIALIST

## 2025-06-12 PROCEDURE — 96374 THER/PROPH/DIAG INJ IV PUSH: CPT

## 2025-06-12 RX ORDER — ASPIRIN 325 MG
325 TABLET ORAL
Status: COMPLETED | OUTPATIENT
Start: 2025-06-12 | End: 2025-06-12

## 2025-06-12 RX ORDER — KETOROLAC TROMETHAMINE 30 MG/ML
15 INJECTION, SOLUTION INTRAMUSCULAR; INTRAVENOUS
Status: COMPLETED | OUTPATIENT
Start: 2025-06-12 | End: 2025-06-12

## 2025-06-12 RX ORDER — METOCLOPRAMIDE HYDROCHLORIDE 5 MG/ML
10 INJECTION INTRAMUSCULAR; INTRAVENOUS
Status: COMPLETED | OUTPATIENT
Start: 2025-06-12 | End: 2025-06-12

## 2025-06-12 RX ORDER — NITROGLYCERIN 0.4 MG/1
0.4 TABLET SUBLINGUAL EVERY 5 MIN PRN
Status: DISCONTINUED | OUTPATIENT
Start: 2025-06-12 | End: 2025-06-12 | Stop reason: HOSPADM

## 2025-06-12 RX ORDER — DIPHENHYDRAMINE HYDROCHLORIDE 50 MG/ML
25 INJECTION, SOLUTION INTRAMUSCULAR; INTRAVENOUS
Status: COMPLETED | OUTPATIENT
Start: 2025-06-12 | End: 2025-06-12

## 2025-06-12 RX ADMIN — ASPIRIN 325 MG: 325 TABLET ORAL at 12:11

## 2025-06-12 RX ADMIN — METOCLOPRAMIDE 10 MG: 5 INJECTION, SOLUTION INTRAMUSCULAR; INTRAVENOUS at 13:13

## 2025-06-12 RX ADMIN — KETOROLAC TROMETHAMINE 15 MG: 30 INJECTION, SOLUTION INTRAMUSCULAR at 13:09

## 2025-06-12 RX ADMIN — DIPHENHYDRAMINE HYDROCHLORIDE 25 MG: 50 INJECTION INTRAMUSCULAR; INTRAVENOUS at 13:09

## 2025-06-12 ASSESSMENT — PAIN DESCRIPTION - LOCATION
LOCATION: CHEST
LOCATION: HEAD

## 2025-06-12 ASSESSMENT — ENCOUNTER SYMPTOMS
EYES NEGATIVE: 1
RESPIRATORY NEGATIVE: 1
GASTROINTESTINAL NEGATIVE: 1

## 2025-06-12 ASSESSMENT — PAIN DESCRIPTION - DESCRIPTORS: DESCRIPTORS: SHARP

## 2025-06-12 ASSESSMENT — PAIN SCALES - GENERAL
PAINLEVEL_OUTOF10: 8
PAINLEVEL_OUTOF10: 10

## 2025-06-12 ASSESSMENT — PAIN - FUNCTIONAL ASSESSMENT: PAIN_FUNCTIONAL_ASSESSMENT: 0-10

## 2025-06-12 NOTE — ED PROVIDER NOTES
[place] EMERGENCY DEPARTMENT  EMERGENCY DEPARTMENT ENCOUNTER      Pt Name: Sandra Jorge  MRN: [id number]  Birthdate 1990  Date of evaluation: 6/12/2025  Provider: Larry Carlson MD    CHIEF COMPLAINT       Chief Complaint   Patient presents with    Chest Pain         HISTORY OF PRESENT ILLNESS   (Location/Symptom, Timing/Onset, Context/Setting, Quality, Duration, Modifying Factors, Severity)  Note limiting factors.   35-year-old female with PMHx of HOCM diagnosed on echocardiogram yesterday, asthma, and interstitial lung disease presents to the emergency department complaining of intermittent left-sided chest pain x 1 hour.  Per medical record, patient eloped from [facility] yesterday where she had been admitted for a syncope workup.  With her findings of HOCM, her physicians there were trying to arrange for her to have a Holter monitor, however the patient eloped prior to this being arranged.  She reports that she was at the grocery store this morning when she began having severe left-sided chest pain.  She has no additional complaints at this time.      The history is provided by the patient.         Review of External Medical Records:     Nursing Notes were reviewed.    REVIEW OF SYSTEMS    (2-9 systems for level 4, 10 or more for level 5)     Review of Systems   Constitutional: Negative.    HENT: Negative.     Eyes: Negative.    Respiratory: Negative.     Cardiovascular:  Positive for chest pain.   Gastrointestinal: Negative.    Genitourinary: Negative.    Musculoskeletal: Negative.    Skin: Negative.    Neurological: Negative.    Psychiatric/Behavioral: Negative.         Except as noted above the remainder of the review of systems was reviewed and negative.       PAST MEDICAL HISTORY     Past Medical History:   Diagnosis Date    Asthma     Ill-defined condition     neuropathy    Other ill-defined conditions(799.89)     gallstones         SURGICAL HISTORY       Past Surgical History:   Procedure  (*)     Immature Granulocytes Absolute 0.06 (*)     All other components within normal limits   COMPREHENSIVE METABOLIC PANEL   TROPONIN T       All other labs were within normal range or not returned as of this dictation.    EMERGENCY DEPARTMENT COURSE and DIFFERENTIAL DIAGNOSIS/MDM:   Vitals:    Vitals:    06/12/25 1239 06/12/25 1249 06/12/25 1315 06/12/25 1330   BP:  (!) 150/91 (!) 157/97 (!) 149/88   Pulse: 67 68 68 66   Resp: 22 22 24 26   Temp:       TempSrc:       SpO2: 96% 95% 98% 97%   Weight:       Height:               Medical Decision Making  DDx: ACS, pericarditis, myocarditis, musculoskeletal pain, anxiety    Plan:  - EKG  - Labs: BMP, CBC, troponin  - Imaging: CXR  - Medications: Aspirin    Reassessment: Initial EKG concerning for ST segment elevations in the anterior leads.  Put in a stat consult cardiology and reviewed the EKG with them.  They have reviewed the data from her recent admission and comparing today's EKG with those done yesterday, they have low concern for ischemia.  Furthermore, they state that the patient's Holter monitor will be mailed to her and that there will be no need for readmission as long as the remainder of her workup is reassuring.  The rest of her lab work is pending.  Will follow-up these results, reassess the patient, and disposition accordingly.    Reassessment: Patient is describing her episodes of pain as sharp, sudden, and lasting approximately 1 second, which is not consistent with angina.  Her workup is reassuring, including a negative troponin.  Favor musculoskeletal pain, gastric pain, pericarditis, or anxiety.  Reviewed these findings with the patient.  She may safely be discharged at this time with recommendation to follow-up with primary care and cardiology as planned.      Amount and/or Complexity of Data Reviewed  Labs: ordered.  Radiology: ordered.  ECG/medicine tests: ordered.    Risk  OTC drugs.  Prescription drug management.  Decision regarding
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
## 2025-06-12 NOTE — DISCHARGE INSTRUCTIONS
You were seen in the emergency department for chest pain.  The results of your tests were reassuring.  Although an exact cause of your symptoms was not identified, the most likely cause is musculoskeletal pain, stomach related pain, pericarditis, or anxiety.  Please take any medications prescribed at this visit as instructed.  Please follow-up with your PCP cardiologist or return to the emergency department if you experience a worsening of symptoms or any new symptoms that are concerning to you.

## 2025-06-12 NOTE — ED PROVIDER NOTES
[facility] EMERGENCY DEPARTMENT  EMERGENCY DEPARTMENT ENCOUNTER      Pt Name: Sandra Jorge  MRN: [id number]  Birthdate 1990  Date of evaluation: 2025  Provider: Nikhil Braswell MD      HISTORY OF PRESENT ILLNESS      35-year-old female history of neuropathy, gallstones who eloped from the hospital today after she was admitted for interstitial lung disease and palpitations presents to the emergency department for reassessment.    The history is provided by the patient and medical records.           Nursing Notes were reviewed.    REVIEW OF SYSTEMS         Review of Systems        PAST MEDICAL HISTORY     Past Medical History:   Diagnosis Date    Asthma     Ill-defined condition     neuropathy    Other ill-defined conditions(799.89)     gallstones         SURGICAL HISTORY       Past Surgical History:   Procedure Laterality Date     SECTION      CHOLECYSTECTOMY, LAPAROSCOPIC  14    COLONOSCOPY N/A 10/31/2019    COLONOSCOPY performed by Tray Martin MD at [facility] ENDOSCOPY    DELIVERY            CURRENT MEDICATIONS       Discharge Medication List as of 2025 10:55 PM        CONTINUE these medications which have NOT CHANGED    Details   ALBUTEROL IN Inhale into the lungs as neededHistorical Med             ALLERGIES     Cantaloupe (diagnostic), Grass pollen(k-o-r-t-swt kacy), Shrimp (diagnostic), and Tree extract    FAMILY HISTORY     No family history on file.       SOCIAL HISTORY       Social History     Socioeconomic History    Marital status: Single   Tobacco Use    Smoking status: Every Day     Current packs/day: 0.50     Types: Cigarettes    Smokeless tobacco: Never   Vaping Use    Vaping status: Never Used   Substance and Sexual Activity    Alcohol use: Yes     Alcohol/week: 1.0 standard drink of alcohol    Drug use: Never     Social Drivers of Health     Food Insecurity: Food Insecurity Present (6/10/2025)    Hunger Vital Sign     Worried About  interpretation performed. Decision-making details documented in ED Course.            REASSESSMENT     ED Course as of 06/12/25 0114 Wed Jun 11, 2025 2126 Discussed with hospitalist regarding elopement today.  At this point patient's workup has been completed and she will not require readmission.  Needs to call the hospital in the morning for Holter order. [JM]   2234 ED EKG interpretation:  Rhythm: sinus rhythm. Rate (approx.): 77.  Axis: normal.  ST segment:  No ischemic ST elevations or depressions.  Nonspecific T wave changes, present on prior.  This EKG was interpreted by Nikhil Braswell MD,ED Physician. [JM]      ED Course User Index  [JM] Nikhil Braswell MD         CONSULTS:  None    PROCEDURES:     Procedures            (Please note that portions of this note were completed with a voice recognition program.  Efforts were made to edit the dictations but occasionally words are mis-transcribed.)    Nikhil Braswell MD (electronically signed)  Emergency Attending Physician              Nikhil Braswell MD  06/12/25 0114

## 2025-06-12 NOTE — ED NOTES
Patient does not appear to be in any acute distress/shows no evidence of clinical instability at this time.   Provider has reviewed discharge instructions with the patient/family.  The patient/family verbalized understanding instructions as well as need for follow up for any further symptoms.  Discharge papers given, education provided, and any questions answered.

## 2025-06-12 NOTE — DISCHARGE INSTRUCTIONS
Please call the [facility] observation unit tomorrow morning at [phone] to arrange for a home Holter monitor

## 2025-06-14 LAB
EKG ATRIAL RATE: 72 BPM
EKG DIAGNOSIS: NORMAL
EKG P AXIS: 39 DEGREES
EKG P-R INTERVAL: 146 MS
EKG Q-T INTERVAL: 376 MS
EKG QRS DURATION: 86 MS
EKG QTC CALCULATION (BAZETT): 411 MS
EKG R AXIS: 34 DEGREES
EKG T AXIS: 171 DEGREES
EKG VENTRICULAR RATE: 72 BPM
GLUCOSE BLD STRIP.AUTO-MCNC: 151 MG/DL (ref 65–117)
SERVICE CMNT-IMP: ABNORMAL

## 2025-06-14 PROCEDURE — 93010 ELECTROCARDIOGRAM REPORT: CPT | Performed by: INTERNAL MEDICINE

## 2025-06-17 ENCOUNTER — TELEPHONE (OUTPATIENT)
Age: 35
End: 2025-06-17

## 2025-06-17 NOTE — TELEPHONE ENCOUNTER
----- Message from Cate SNOWDEN sent at 6/12/2025  2:12 PM EDT -----  Pt disch & monitor ordered in hosp.  EKG is calling pt to verify address, f/u etc.  Ebonie will call me back.  ----- Message -----  From: Price Talamantes MD  Sent: 6/12/2025  12:01 PM EDT  To: Cate Singh    She needs 14 day Holter for syncope.   Thanks.
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
## 2025-06-27 ENCOUNTER — HOSPITAL ENCOUNTER (EMERGENCY)
Facility: HOSPITAL | Age: 35
Discharge: HOME | End: 2025-06-28
Attending: EMERGENCY MEDICINE
Payer: MEDICAID

## 2025-06-27 VITALS
WEIGHT: 200 LBS | RESPIRATION RATE: 18 BRPM | DIASTOLIC BLOOD PRESSURE: 96 MMHG | SYSTOLIC BLOOD PRESSURE: 135 MMHG | HEART RATE: 77 BPM | HEIGHT: 64 IN | BODY MASS INDEX: 34.15 KG/M2 | TEMPERATURE: 97.9 F | OXYGEN SATURATION: 96 %

## 2025-06-27 DIAGNOSIS — M62.838 SPASM OF MUSCLE: Primary | ICD-10-CM

## 2025-06-27 PROCEDURE — 99283 EMERGENCY DEPT VISIT LOW MDM: CPT

## 2025-06-27 ASSESSMENT — PAIN SCALES - GENERAL: PAINLEVEL_OUTOF10: 7

## 2025-06-27 ASSESSMENT — PAIN - FUNCTIONAL ASSESSMENT: PAIN_FUNCTIONAL_ASSESSMENT: 0-10

## 2025-06-28 RX ORDER — LIDOCAINE 50 MG/G
1 PATCH TOPICAL DAILY
Qty: 30 PATCH | Refills: 0 | Status: SHIPPED | OUTPATIENT
Start: 2025-06-28 | End: 2025-07-28

## 2025-06-28 RX ORDER — NAPROXEN 500 MG/1
500 TABLET ORAL 2 TIMES DAILY WITH MEALS
Qty: 60 TABLET | Refills: 0 | Status: SHIPPED | OUTPATIENT
Start: 2025-06-28

## 2025-06-28 RX ORDER — CYCLOBENZAPRINE HCL 10 MG
10 TABLET ORAL 3 TIMES DAILY PRN
Qty: 21 TABLET | Refills: 0 | Status: SHIPPED | OUTPATIENT
Start: 2025-06-28 | End: 2025-07-08

## 2025-06-28 ASSESSMENT — ENCOUNTER SYMPTOMS: SHORTNESS OF BREATH: 0
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
## 2025-06-28 NOTE — DISCHARGE INSTRUCTIONS
Follow closely with the orthopedic specialist as scheduled and return to the emergency department for any new or worsening symptoms
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
## 2025-06-30 ENCOUNTER — OFFICE VISIT (OUTPATIENT)
Age: 35
End: 2025-06-30
Payer: MEDICAID

## 2025-06-30 VITALS
DIASTOLIC BLOOD PRESSURE: 86 MMHG | SYSTOLIC BLOOD PRESSURE: 132 MMHG | HEIGHT: 64 IN | BODY MASS INDEX: 34.15 KG/M2 | HEART RATE: 72 BPM | WEIGHT: 200 LBS | OXYGEN SATURATION: 98 %

## 2025-06-30 DIAGNOSIS — I42.2 HYPERTROPHIC CARDIOMYOPATHY (HCC): ICD-10-CM

## 2025-06-30 DIAGNOSIS — R00.2 PALPITATION: ICD-10-CM

## 2025-06-30 DIAGNOSIS — R55 NEAR SYNCOPE: Primary | ICD-10-CM

## 2025-06-30 PROCEDURE — 99214 OFFICE O/P EST MOD 30 MIN: CPT

## 2025-06-30 RX ORDER — NICOTINE 21 MG/24HR
1 PATCH, TRANSDERMAL 24 HOURS TRANSDERMAL DAILY
Qty: 42 PATCH | Refills: 0 | Status: SHIPPED | OUTPATIENT
Start: 2025-06-30 | End: 2025-08-11

## 2025-06-30 NOTE — PATIENT INSTRUCTIONS
Dr. Lars Chisholm - please call and schedule an appointment with his office.   [phone]    Please call and schedule a cardiac MRI. The number to call is [phone]     Please call the sleep clinic and get back in to see them.

## 2025-06-30 NOTE — PROGRESS NOTES
Chief Complaint   Patient presents with    Chest Pain    Syncope    Palpitations     Vitals:    06/30/25 0958   BP: 132/86   BP Site: Left Upper Arm   Patient Position: Sitting   Pulse: 72   SpO2: 98%   Weight: 90.7 kg (200 lb)   Height: 1.626 m (5' 4\")         Chest pain: DENIED     Recent hospital stays: DENIED     Refills: DENIED     Patient complaints of fluctuation in heart rate. States that when she went for a colonoscopy her heart rate dropped to the 40's. States that she had to have a 'boost in heart rate' before being put in.     Please discuss quitting nicotine during visit.     Patient left AMA from hospital. Patient states due to this, she never received the results from her testing in the hospital.

## 2025-07-01 ENCOUNTER — CLINICAL DOCUMENTATION (OUTPATIENT)
Age: 35
End: 2025-07-01

## 2025-07-01 NOTE — PROGRESS NOTES
Received fax request for records from [facility] for holter due to insurance.  Faxed copy of hosp consult 6/11/25 Dr Poole, neuro consult 6/11/25 Dr Styles, EKG 6/10/25, echo 6/11/25,vascular 6/11/25.  Faxed records to fax # [phone].  Received confirmation.

## 2025-07-12 ENCOUNTER — APPOINTMENT (OUTPATIENT)
Facility: HOSPITAL | Age: 35
DRG: 194 | End: 2025-07-12
Payer: MEDICAID

## 2025-07-12 ENCOUNTER — HOSPITAL ENCOUNTER (EMERGENCY)
Facility: HOSPITAL | Age: 35
Discharge: HOME | DRG: 194 | End: 2025-07-12
Attending: STUDENT IN AN ORGANIZED HEALTH CARE EDUCATION/TRAINING PROGRAM | Admitting: INTERNAL MEDICINE
Payer: MEDICAID

## 2025-07-12 VITALS
HEART RATE: 63 BPM | OXYGEN SATURATION: 98 % | RESPIRATION RATE: 18 BRPM | WEIGHT: 203.26 LBS | TEMPERATURE: 98.8 F | HEIGHT: 64 IN | DIASTOLIC BLOOD PRESSURE: 74 MMHG | SYSTOLIC BLOOD PRESSURE: 125 MMHG | BODY MASS INDEX: 34.7 KG/M2

## 2025-07-12 DIAGNOSIS — I42.2 HYPERTROPHIC CARDIOMYOPATHY (HCC): ICD-10-CM

## 2025-07-12 DIAGNOSIS — R79.89 ELEVATED BRAIN NATRIURETIC PEPTIDE (BNP) LEVEL: Primary | ICD-10-CM

## 2025-07-12 DIAGNOSIS — R31.9 URINARY TRACT INFECTION WITH HEMATURIA, SITE UNSPECIFIED: ICD-10-CM

## 2025-07-12 DIAGNOSIS — R60.0 PERIPHERAL EDEMA: ICD-10-CM

## 2025-07-12 DIAGNOSIS — N39.0 URINARY TRACT INFECTION WITH HEMATURIA, SITE UNSPECIFIED: ICD-10-CM

## 2025-07-12 PROBLEM — I50.33 ACUTE ON CHRONIC HEART FAILURE WITH PRESERVED EJECTION FRACTION (HFPEF) (HCC): Status: ACTIVE | Noted: 2025-07-12

## 2025-07-12 LAB
ALBUMIN SERPL-MCNC: 4 G/DL (ref 3.5–5.2)
ALBUMIN/GLOB SERPL: 1.4 (ref 1.1–2.2)
ALP SERPL-CCNC: 84 U/L (ref 35–104)
ALT SERPL-CCNC: 11 U/L (ref 10–35)
ANION GAP SERPL CALC-SCNC: 14 MMOL/L (ref 2–12)
APPEARANCE UR: ABNORMAL
AST SERPL-CCNC: 19 U/L (ref 10–35)
BACTERIA URNS QL MICRO: ABNORMAL /HPF
BASOPHILS # BLD: 0.1 K/UL (ref 0–0.1)
BASOPHILS NFR BLD: 0.8 % (ref 0–1)
BILIRUB SERPL-MCNC: 0.7 MG/DL (ref 0.2–1)
BILIRUB UR QL: NEGATIVE
BUN SERPL-MCNC: 11 MG/DL (ref 6–20)
BUN/CREAT SERPL: 14 (ref 12–20)
CALCIUM SERPL-MCNC: 9.4 MG/DL (ref 8.6–10)
CHLORIDE SERPL-SCNC: 105 MMOL/L (ref 98–107)
CO2 SERPL-SCNC: 22 MMOL/L (ref 22–29)
COLOR UR: ABNORMAL
CREAT SERPL-MCNC: 0.78 MG/DL (ref 0.5–0.9)
DIFFERENTIAL METHOD BLD: ABNORMAL
ECHO BSA: 2.04 M2
EOSINOPHIL # BLD: 0.38 K/UL (ref 0–0.4)
EOSINOPHIL NFR BLD: 3.2 % (ref 0–7)
EPITH CASTS URNS QL MICRO: ABNORMAL /LPF
ERYTHROCYTE [DISTWIDTH] IN BLOOD BY AUTOMATED COUNT: 12.7 % (ref 11.5–14.5)
GLOBULIN SER CALC-MCNC: 2.9 G/DL (ref 2–4)
GLUCOSE SERPL-MCNC: 99 MG/DL (ref 65–100)
GLUCOSE UR STRIP.AUTO-MCNC: NEGATIVE MG/DL
HCG UR QL: NEGATIVE
HCT VFR BLD AUTO: 41.3 % (ref 35–47)
HGB BLD-MCNC: 14.1 G/DL (ref 11.5–16)
HGB UR QL STRIP: ABNORMAL
IMM GRANULOCYTES # BLD AUTO: 0.07 K/UL (ref 0–0.04)
IMM GRANULOCYTES NFR BLD AUTO: 0.6 % (ref 0–0.5)
KETONES UR QL STRIP.AUTO: NEGATIVE MG/DL
LEUKOCYTE ESTERASE UR QL STRIP.AUTO: ABNORMAL
LYMPHOCYTES # BLD: 5.15 K/UL (ref 0.8–3.5)
LYMPHOCYTES NFR BLD: 43.2 % (ref 12–49)
MAGNESIUM SERPL-MCNC: 2 MG/DL (ref 1.6–2.6)
MCH RBC QN AUTO: 30.1 PG (ref 26–34)
MCHC RBC AUTO-ENTMCNC: 34.1 G/DL (ref 30–36.5)
MCV RBC AUTO: 88.2 FL (ref 80–99)
MONOCYTES # BLD: 0.66 K/UL (ref 0–1)
MONOCYTES NFR BLD: 5.5 % (ref 5–13)
MUCOUS THREADS URNS QL MICRO: ABNORMAL /LPF
NEUTS SEG # BLD: 5.55 K/UL (ref 1.8–8)
NEUTS SEG NFR BLD: 46.7 % (ref 32–75)
NITRITE UR QL STRIP.AUTO: POSITIVE
NRBC # BLD: 0 K/UL (ref 0–0.01)
NRBC BLD-RTO: 0 PER 100 WBC
NT PRO BNP: 643 PG/ML
PH UR STRIP: 6 (ref 5–8)
PLATELET # BLD AUTO: 300 K/UL (ref 150–400)
PMV BLD AUTO: 9.6 FL (ref 8.9–12.9)
POTASSIUM SERPL-SCNC: 3.8 MMOL/L (ref 3.5–5.1)
PROT SERPL-MCNC: 6.9 G/DL (ref 6.4–8.3)
PROT UR STRIP-MCNC: NEGATIVE MG/DL
RBC # BLD AUTO: 4.68 M/UL (ref 3.8–5.2)
RBC #/AREA URNS HPF: ABNORMAL /HPF
SODIUM SERPL-SCNC: 141 MMOL/L (ref 136–145)
SP GR UR REFRACTOMETRY: 1.01 (ref 1–1.03)
TROPONIN T SERPL HS-MCNC: 8.6 NG/L (ref 0–14)
URINE CULTURE IF INDICATED: ABNORMAL
UROBILINOGEN UR QL STRIP.AUTO: 0.2 EU/DL (ref 0.2–1)
WBC # BLD AUTO: 11.9 K/UL (ref 3.6–11)
WBC URNS QL MICRO: ABNORMAL /HPF (ref 0–4)

## 2025-07-12 PROCEDURE — 81001 URINALYSIS AUTO W/SCOPE: CPT

## 2025-07-12 PROCEDURE — 84484 ASSAY OF TROPONIN QUANT: CPT

## 2025-07-12 PROCEDURE — 71275 CT ANGIOGRAPHY CHEST: CPT

## 2025-07-12 PROCEDURE — 83880 ASSAY OF NATRIURETIC PEPTIDE: CPT

## 2025-07-12 PROCEDURE — 85025 COMPLETE CBC W/AUTO DIFF WBC: CPT

## 2025-07-12 PROCEDURE — 2060000000 HC ICU INTERMEDIATE R&B

## 2025-07-12 PROCEDURE — 36415 COLL VENOUS BLD VENIPUNCTURE: CPT

## 2025-07-12 PROCEDURE — 93970 EXTREMITY STUDY: CPT

## 2025-07-12 PROCEDURE — 71046 X-RAY EXAM CHEST 2 VIEWS: CPT

## 2025-07-12 PROCEDURE — 6360000004 HC RX CONTRAST MEDICATION: Performed by: STUDENT IN AN ORGANIZED HEALTH CARE EDUCATION/TRAINING PROGRAM

## 2025-07-12 PROCEDURE — 6360000002 HC RX W HCPCS: Performed by: STUDENT IN AN ORGANIZED HEALTH CARE EDUCATION/TRAINING PROGRAM

## 2025-07-12 PROCEDURE — 93005 ELECTROCARDIOGRAM TRACING: CPT | Performed by: STUDENT IN AN ORGANIZED HEALTH CARE EDUCATION/TRAINING PROGRAM

## 2025-07-12 PROCEDURE — 81025 URINE PREGNANCY TEST: CPT

## 2025-07-12 PROCEDURE — 96375 TX/PRO/DX INJ NEW DRUG ADDON: CPT

## 2025-07-12 PROCEDURE — 96374 THER/PROPH/DIAG INJ IV PUSH: CPT

## 2025-07-12 PROCEDURE — 83735 ASSAY OF MAGNESIUM: CPT

## 2025-07-12 PROCEDURE — 99285 EMERGENCY DEPT VISIT HI MDM: CPT

## 2025-07-12 PROCEDURE — 80053 COMPREHEN METABOLIC PANEL: CPT

## 2025-07-12 PROCEDURE — 2500000003 HC RX 250 WO HCPCS: Performed by: STUDENT IN AN ORGANIZED HEALTH CARE EDUCATION/TRAINING PROGRAM

## 2025-07-12 RX ORDER — IOPAMIDOL 755 MG/ML
100 INJECTION, SOLUTION INTRAVASCULAR
Status: COMPLETED | OUTPATIENT
Start: 2025-07-12 | End: 2025-07-12

## 2025-07-12 RX ORDER — FUROSEMIDE 10 MG/ML
80 INJECTION INTRAMUSCULAR; INTRAVENOUS ONCE
Status: COMPLETED | OUTPATIENT
Start: 2025-07-12 | End: 2025-07-12

## 2025-07-12 RX ADMIN — WATER 2000 MG: 1 INJECTION INTRAMUSCULAR; INTRAVENOUS; SUBCUTANEOUS at 05:53

## 2025-07-12 RX ADMIN — IOPAMIDOL 100 ML: 755 INJECTION, SOLUTION INTRAVENOUS at 05:48

## 2025-07-12 RX ADMIN — FUROSEMIDE 80 MG: 10 INJECTION, SOLUTION INTRAMUSCULAR; INTRAVENOUS at 05:53

## 2025-07-12 ASSESSMENT — PAIN DESCRIPTION - LOCATION: LOCATION: ANKLE

## 2025-07-12 ASSESSMENT — ENCOUNTER SYMPTOMS
SHORTNESS OF BREATH: 1
CHEST TIGHTNESS: 1
ABDOMINAL PAIN: 0

## 2025-07-12 ASSESSMENT — PAIN - FUNCTIONAL ASSESSMENT
PAIN_FUNCTIONAL_ASSESSMENT: NONE - DENIES PAIN
PAIN_FUNCTIONAL_ASSESSMENT: NONE - DENIES PAIN

## 2025-07-12 ASSESSMENT — PAIN SCALES - GENERAL: PAINLEVEL_OUTOF10: 4

## 2025-07-12 NOTE — ED NOTES
Pt attempted a ua sample, pt aware the need for ua sample, labs drawn w/butterfly needs as per pt request, lac #21g butterfly needled used for peripheral lab draw

## 2025-07-12 NOTE — ED TRIAGE NOTES
Pt presents to the ED w/ c/o BLE swelling that began about a week ago. She's concerned that it may be cardiac related as she was recently seen for palpitations and hypertrophic cardiomyopathy by a cardiologist. Pt also reports increased urine frequency and foul smelling urine.

## 2025-07-12 NOTE — ED NOTES
Kamala tovar aware of pt stating flickers of light porfirio eyes, pt denies any light headed and/or dizziness. Pt remains a/o4. Speaking in full,clear, complete sentences

## 2025-07-12 NOTE — ED NOTES
Pt has voided x2 since iv lasix  Pt remains on cardiac and pulse ox monitoring, pt is in ed room 6 w/restroom in room, pt ambulatory,steady gait to restroom, side rail upx1, call bell w/in reach  Pt cell phone is w/her at bedside, pt clothing including shirt, pants, sandals, hand wallet and car keys in pt belongings bag and at bedside w/pt

## 2025-07-12 NOTE — ED NOTES
Kamala tovar aware pt does not have iv access as per her request pt labs drawn w/a butterfly needle

## 2025-07-12 NOTE — ED NOTES
Pt in triage w/triage rn, edp guy to triage for initial assessment, orders pending, pt going to room 6

## 2025-07-12 NOTE — ED NOTES
Pt called out and states starting to see flickers of light in both eyes, spoke to christiano arreola whom informed sravani tovar

## 2025-07-12 NOTE — ED NOTES
Received signout on patient.  Patient now refusing at to leave.  She has normal vital signs, complaints her leg swelling has completely resolved.  No edema on exam.  Will discharge with close cardiology follow-up.    MD Juliette Sutton Joseph, MD  07/12/25 [id number]

## 2025-07-12 NOTE — ED PROVIDER NOTES
[facility] EMERGENCY DEPARTMENT  EMERGENCY DEPARTMENT ENCOUNTER      Pt Name: Sandra Jorge  MRN: [id number]  Birthdate 1990  Date of evaluation: 2025  Provider: Quinton Rice DO    CHIEF COMPLAINT       Chief Complaint   Patient presents with    Foot Swelling     bilateral    Urinary Frequency         HISTORY OF PRESENT ILLNESS   (Location/Symptom, Timing/Onset, Context/Setting, Quality, Duration, Modifying Factors, Severity)  Note limiting factors.   35-year-old female with a history of hypertrophic cardiomyopathy presents to the ED for evaluation of worsening edema of the lower extremities for the last week, she has had some dyspnea with exertion and some palpitations.  Follows with cardiology, also has had some increased urinary frequency and foul-smelling urine.            Review of External Medical Records:     Nursing Notes were reviewed.    REVIEW OF SYSTEMS    (2-9 systems for level 4, 10 or more for level 5)     Review of Systems   Respiratory:  Positive for chest tightness and shortness of breath.    Cardiovascular:  Positive for palpitations and leg swelling.   Gastrointestinal:  Negative for abdominal pain.   Genitourinary:  Positive for frequency.       Except as noted above the remainder of the review of systems was reviewed and negative.       PAST MEDICAL HISTORY     Past Medical History:   Diagnosis Date    Asthma     Ill-defined condition     neuropathy    Other ill-defined conditions(799.89)     gallstones         SURGICAL HISTORY       Past Surgical History:   Procedure Laterality Date     SECTION      CHOLECYSTECTOMY, LAPAROSCOPIC  14    COLONOSCOPY N/A 10/31/2019    COLONOSCOPY performed by Tray Martin MD at [facility] ENDOSCOPY    DELIVERY            CURRENT MEDICATIONS       Previous Medications    ALBUTEROL IN    Inhale into the lungs as needed    LIDOCAINE (LIDODERM) 5 %    Place 1 patch onto the skin daily 12 hours on, 12 hours off.    NAPROXEN

## 2025-07-13 LAB
EKG ATRIAL RATE: 68 BPM
EKG DIAGNOSIS: NORMAL
EKG P AXIS: 33 DEGREES
EKG P-R INTERVAL: 142 MS
EKG Q-T INTERVAL: 402 MS
EKG QRS DURATION: 94 MS
EKG QTC CALCULATION (BAZETT): 427 MS
EKG R AXIS: 30 DEGREES
EKG T AXIS: 166 DEGREES
EKG VENTRICULAR RATE: 68 BPM

## 2025-07-13 PROCEDURE — 93010 ELECTROCARDIOGRAM REPORT: CPT | Performed by: INTERNAL MEDICINE

## 2025-07-16 ENCOUNTER — HOSPITAL ENCOUNTER (EMERGENCY)
Facility: HOSPITAL | Age: 35
Discharge: HOME | End: 2025-07-16
Attending: EMERGENCY MEDICINE
Payer: MEDICAID

## 2025-07-16 ENCOUNTER — APPOINTMENT (OUTPATIENT)
Facility: HOSPITAL | Age: 35
End: 2025-07-16
Payer: MEDICAID

## 2025-07-16 VITALS
TEMPERATURE: 98.6 F | WEIGHT: 207 LBS | HEART RATE: 67 BPM | RESPIRATION RATE: 12 BRPM | HEIGHT: 64 IN | SYSTOLIC BLOOD PRESSURE: 152 MMHG | DIASTOLIC BLOOD PRESSURE: 103 MMHG | BODY MASS INDEX: 35.34 KG/M2 | OXYGEN SATURATION: 100 %

## 2025-07-16 DIAGNOSIS — I42.2 HYPERTROPHIC CARDIOMYOPATHY (HCC): Primary | ICD-10-CM

## 2025-07-16 DIAGNOSIS — R60.0 PEDAL EDEMA: ICD-10-CM

## 2025-07-16 DIAGNOSIS — R00.2 PALPITATIONS: ICD-10-CM

## 2025-07-16 LAB
ALBUMIN SERPL-MCNC: 3.4 G/DL (ref 3.5–5)
ALBUMIN/GLOB SERPL: 1 (ref 1.1–2.2)
ALP SERPL-CCNC: 79 U/L (ref 45–117)
ALT SERPL-CCNC: 20 U/L (ref 12–78)
ANION GAP SERPL CALC-SCNC: 3 MMOL/L (ref 2–12)
AST SERPL-CCNC: 20 U/L (ref 15–37)
BASOPHILS # BLD: 0 K/UL (ref 0–0.1)
BASOPHILS NFR BLD: 0 % (ref 0–1)
BILIRUB SERPL-MCNC: 0.5 MG/DL (ref 0.2–1)
BUN SERPL-MCNC: 10 MG/DL (ref 6–20)
BUN/CREAT SERPL: 12 (ref 12–20)
CALCIUM SERPL-MCNC: 8.7 MG/DL (ref 8.5–10.1)
CHLORIDE SERPL-SCNC: 111 MMOL/L (ref 97–108)
CO2 SERPL-SCNC: 26 MMOL/L (ref 21–32)
COMMENT:: NORMAL
CREAT SERPL-MCNC: 0.85 MG/DL (ref 0.55–1.02)
DIFFERENTIAL METHOD BLD: ABNORMAL
EKG ATRIAL RATE: 82 BPM
EKG DIAGNOSIS: NORMAL
EKG P AXIS: 48 DEGREES
EKG P-R INTERVAL: 156 MS
EKG Q-T INTERVAL: 378 MS
EKG QRS DURATION: 88 MS
EKG QTC CALCULATION (BAZETT): 441 MS
EKG R AXIS: 56 DEGREES
EKG T AXIS: 212 DEGREES
EKG VENTRICULAR RATE: 82 BPM
EOSINOPHIL # BLD: 0 K/UL (ref 0–0.4)
EOSINOPHIL NFR BLD: 0 % (ref 0–7)
ERYTHROCYTE [DISTWIDTH] IN BLOOD BY AUTOMATED COUNT: 12.5 % (ref 11.5–14.5)
GLOBULIN SER CALC-MCNC: 3.4 G/DL (ref 2–4)
GLUCOSE SERPL-MCNC: 85 MG/DL (ref 65–100)
HCT VFR BLD AUTO: 39.2 % (ref 35–47)
HGB BLD-MCNC: 13.4 G/DL (ref 11.5–16)
IMM GRANULOCYTES # BLD AUTO: 0 K/UL
IMM GRANULOCYTES NFR BLD AUTO: 0 %
LYMPHOCYTES # BLD: 5.62 K/UL (ref 0.8–3.5)
LYMPHOCYTES NFR BLD: 52 % (ref 12–49)
MAGNESIUM SERPL-MCNC: 2.1 MG/DL (ref 1.6–2.4)
MCH RBC QN AUTO: 29.8 PG (ref 26–34)
MCHC RBC AUTO-ENTMCNC: 34.2 G/DL (ref 30–36.5)
MCV RBC AUTO: 87.1 FL (ref 80–99)
MONOCYTES # BLD: 0.32 K/UL (ref 0–1)
MONOCYTES NFR BLD: 3 % (ref 5–13)
NEUTS SEG # BLD: 4.86 K/UL (ref 1.8–8)
NEUTS SEG NFR BLD: 45 % (ref 32–75)
NRBC # BLD: 0 K/UL (ref 0–0.01)
NRBC BLD-RTO: 0 PER 100 WBC
NT PRO BNP: 779 PG/ML
PLATELET # BLD AUTO: 296 K/UL (ref 150–400)
PMV BLD AUTO: 9.6 FL (ref 8.9–12.9)
POTASSIUM SERPL-SCNC: 3.9 MMOL/L (ref 3.5–5.1)
PROT SERPL-MCNC: 6.8 G/DL (ref 6.4–8.2)
RBC # BLD AUTO: 4.5 M/UL (ref 3.8–5.2)
RBC MORPH BLD: ABNORMAL
SODIUM SERPL-SCNC: 140 MMOL/L (ref 136–145)
SPECIMEN HOLD: NORMAL
TROPONIN I SERPL HS-MCNC: 23 NG/L (ref 0–51)
TROPONIN I SERPL HS-MCNC: 24 NG/L (ref 0–51)
WBC # BLD AUTO: 10.8 K/UL (ref 3.6–11)
WBC MORPH BLD: ABNORMAL

## 2025-07-16 PROCEDURE — 36415 COLL VENOUS BLD VENIPUNCTURE: CPT

## 2025-07-16 PROCEDURE — 93010 ELECTROCARDIOGRAM REPORT: CPT | Performed by: INTERNAL MEDICINE

## 2025-07-16 PROCEDURE — 99285 EMERGENCY DEPT VISIT HI MDM: CPT

## 2025-07-16 PROCEDURE — 83735 ASSAY OF MAGNESIUM: CPT

## 2025-07-16 PROCEDURE — 71046 X-RAY EXAM CHEST 2 VIEWS: CPT

## 2025-07-16 PROCEDURE — 99222 1ST HOSP IP/OBS MODERATE 55: CPT | Performed by: STUDENT IN AN ORGANIZED HEALTH CARE EDUCATION/TRAINING PROGRAM

## 2025-07-16 PROCEDURE — APPSS45 APP SPLIT SHARED TIME 31-45 MINUTES

## 2025-07-16 PROCEDURE — 80053 COMPREHEN METABOLIC PANEL: CPT

## 2025-07-16 PROCEDURE — 83880 ASSAY OF NATRIURETIC PEPTIDE: CPT

## 2025-07-16 PROCEDURE — 93005 ELECTROCARDIOGRAM TRACING: CPT | Performed by: EMERGENCY MEDICINE

## 2025-07-16 PROCEDURE — 84484 ASSAY OF TROPONIN QUANT: CPT

## 2025-07-16 PROCEDURE — 85025 COMPLETE CBC W/AUTO DIFF WBC: CPT

## 2025-07-16 RX ORDER — METOPROLOL SUCCINATE 25 MG/1
25 TABLET, EXTENDED RELEASE ORAL
Status: DISCONTINUED | OUTPATIENT
Start: 2025-07-16 | End: 2025-07-16

## 2025-07-16 RX ORDER — FUROSEMIDE 40 MG/1
20 TABLET ORAL DAILY PRN
Status: DISCONTINUED | OUTPATIENT
Start: 2025-07-16 | End: 2025-07-16

## 2025-07-16 RX ORDER — FUROSEMIDE 20 MG/1
20 TABLET ORAL DAILY PRN
Qty: 30 TABLET | Refills: 0 | Status: SHIPPED | OUTPATIENT
Start: 2025-07-16

## 2025-07-16 RX ORDER — VERAPAMIL HYDROCHLORIDE 120 MG/1
120 TABLET, FILM COATED, EXTENDED RELEASE ORAL DAILY
Qty: 90 TABLET | Refills: 1 | Status: SHIPPED | OUTPATIENT
Start: 2025-07-16

## 2025-07-16 RX ORDER — VERAPAMIL HYDROCHLORIDE 120 MG/1
120 TABLET, FILM COATED, EXTENDED RELEASE ORAL DAILY
Status: DISCONTINUED | OUTPATIENT
Start: 2025-07-16 | End: 2025-07-16

## 2025-07-16 ASSESSMENT — PAIN DESCRIPTION - LOCATION: LOCATION: HEAD;CHEST

## 2025-07-16 ASSESSMENT — PAIN DESCRIPTION - DESCRIPTORS: DESCRIPTORS: OTHER (COMMENT)

## 2025-07-16 ASSESSMENT — PAIN - FUNCTIONAL ASSESSMENT
PAIN_FUNCTIONAL_ASSESSMENT: 0-10
PAIN_FUNCTIONAL_ASSESSMENT: NONE - DENIES PAIN

## 2025-07-16 ASSESSMENT — PAIN SCALES - GENERAL: PAINLEVEL_OUTOF10: 6

## 2025-07-16 NOTE — CONSULTS
[facility] CARDIOLOGY                       Cardiology Care Note     [x]Initial Encounter     []Follow-up    Patient Name: Sandra Jorge - :1990 - MRN:[id number]  Primary Cardiologist: Trisha Poole MD  Consulting Cardiologist: Larry Skaggs DO     Reason for encounter:        HPI:       Sandra Jorge is a 35 y.o. female with PMH significant for HCM who presents to the ED with complaints of fluttering in her chest for 40 minutes prior to arrival. In the ED, labs are unremarkable. EKG consistent with LVH, normal sinus rhythm. Troponin 24. Cardiology was consulted.     Patient seen in ED and complains of intermittent shortness of breath and chest palpitations.        Assessment and Plan     # Palpitations   # HCM   - Start verapimil 120 mg daily   - Lasix PRN   - Follow up outpatient   - Will need referral to [facility] for HCM specialist          ____________________________________________________________    Cardiac testing    Telemetry checked - NSR     EKG- NSR       Prior Cardiac Workup Includes:  Echo 2025  -- Left Ventricle: Hyperdynamic left ventricular systolic function with a visually estimated EF of greater than 80%. EF by 2D Simpsons Biplane is 78%. Severely increased wall thickness. Severely increased ventricular mass. Findings consistent with concentric hypertrophy. Normal wall motion. Abnormal diastolic function. Echocardiographic features are suggestive of hypertrophic cardiomyopathy.  Provocative maneuvers were not performed to adequately assess for obstruction.  -- Right Ventricle: Right ventricle size is normal. Normal systolic function. TAPSE is 2.3 cm.      Past Medical History:  Past Medical History:   Diagnosis Date    Asthma     Ill-defined condition     neuropathy    Other ill-defined conditions(029.96)     gallstones       Past Surgical History:   Procedure Laterality Date     SECTION
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
## 2025-08-08 ENCOUNTER — CLINICAL DOCUMENTATION (OUTPATIENT)
Age: 35
End: 2025-08-08

## 2025-08-13 ENCOUNTER — HOSPITAL ENCOUNTER (OUTPATIENT)
Facility: HOSPITAL | Age: 35
Discharge: HOME | End: 2025-08-16
Payer: MEDICAID

## 2025-08-13 VITALS — BODY MASS INDEX: 35.53 KG/M2 | WEIGHT: 207 LBS

## 2025-08-13 DIAGNOSIS — I42.2 HYPERTROPHIC CARDIOMYOPATHY (HCC): ICD-10-CM

## 2025-08-13 PROCEDURE — A9579 GAD-BASE MR CONTRAST NOS,1ML: HCPCS | Performed by: RADIOLOGY

## 2025-08-13 PROCEDURE — 6360000004 HC RX CONTRAST MEDICATION: Performed by: RADIOLOGY

## 2025-08-13 PROCEDURE — 75561 CARDIAC MRI FOR MORPH W/DYE: CPT

## 2025-08-13 PROCEDURE — 75561 CARDIAC MRI FOR MORPH W/DYE: CPT | Performed by: INTERNAL MEDICINE

## 2025-08-13 RX ORDER — GADOTERIDOL 279.3 MG/ML
28 INJECTION INTRAVENOUS
Status: DISCONTINUED | OUTPATIENT
Start: 2025-08-13 | End: 2025-08-13

## 2025-08-13 RX ORDER — GADOTERIDOL 279.3 MG/ML
28 INJECTION INTRAVENOUS
Status: COMPLETED | OUTPATIENT
Start: 2025-08-13 | End: 2025-08-13

## 2025-08-13 RX ADMIN — GADOTERIDOL 28 ML: 279.3 INJECTION, SOLUTION INTRAVENOUS at 12:38

## 2025-09-03 ENCOUNTER — CLINICAL DOCUMENTATION (OUTPATIENT)
Age: 35
End: 2025-09-03

## 2025-09-03 ENCOUNTER — OFFICE VISIT (OUTPATIENT)
Facility: CLINIC | Age: 35
End: 2025-09-03
Payer: MEDICAID

## 2025-09-03 VITALS
BODY MASS INDEX: 36.12 KG/M2 | HEIGHT: 64 IN | HEART RATE: 77 BPM | SYSTOLIC BLOOD PRESSURE: 152 MMHG | WEIGHT: 211.6 LBS | DIASTOLIC BLOOD PRESSURE: 103 MMHG | OXYGEN SATURATION: 99 %

## 2025-09-03 DIAGNOSIS — I42.2 HYPERTROPHIC CARDIOMYOPATHY (HCC): Primary | ICD-10-CM

## 2025-09-03 DIAGNOSIS — Z76.89 ENCOUNTER TO ESTABLISH CARE: Primary | ICD-10-CM

## 2025-09-03 DIAGNOSIS — I10 ESSENTIAL HYPERTENSION: ICD-10-CM

## 2025-09-03 DIAGNOSIS — I42.2 HYPERTROPHIC CARDIOMYOPATHY (HCC): ICD-10-CM

## 2025-09-03 DIAGNOSIS — I50.33 ACUTE ON CHRONIC HEART FAILURE WITH PRESERVED EJECTION FRACTION (HFPEF) (HCC): ICD-10-CM

## 2025-09-03 DIAGNOSIS — Z87.891 PERSONAL HISTORY OF TOBACCO USE, PRESENTING HAZARDS TO HEALTH: ICD-10-CM

## 2025-09-03 DIAGNOSIS — R00.2 PALPITATIONS: ICD-10-CM

## 2025-09-03 DIAGNOSIS — R60.0 PEDAL EDEMA: ICD-10-CM

## 2025-09-03 DIAGNOSIS — M54.81 OCCIPITAL NEURALGIA OF LEFT SIDE: ICD-10-CM

## 2025-09-03 PROCEDURE — 99204 OFFICE O/P NEW MOD 45 MIN: CPT

## 2025-09-03 PROCEDURE — 3077F SYST BP >= 140 MM HG: CPT

## 2025-09-03 PROCEDURE — 3080F DIAST BP >= 90 MM HG: CPT

## 2025-09-03 PROCEDURE — 99406 BEHAV CHNG SMOKING 3-10 MIN: CPT

## 2025-09-03 RX ORDER — BUPROPION HYDROCHLORIDE 150 MG/1
150 TABLET ORAL EVERY MORNING
Qty: 30 TABLET | Refills: 3 | Status: SHIPPED | OUTPATIENT
Start: 2025-09-03

## 2025-09-03 RX ORDER — FUROSEMIDE 20 MG/1
20 TABLET ORAL DAILY PRN
Qty: 30 TABLET | Refills: 0 | Status: SHIPPED | OUTPATIENT
Start: 2025-09-03

## 2025-09-03 RX ORDER — VERAPAMIL HYDROCHLORIDE 120 MG/1
120 TABLET, FILM COATED, EXTENDED RELEASE ORAL DAILY
Qty: 90 TABLET | Refills: 1 | Status: CANCELLED | OUTPATIENT
Start: 2025-09-03

## 2025-09-03 RX ORDER — FUROSEMIDE 20 MG/1
20 TABLET ORAL DAILY PRN
Qty: 30 TABLET | Refills: 0 | Status: CANCELLED | OUTPATIENT
Start: 2025-09-03

## 2025-09-03 ASSESSMENT — PATIENT HEALTH QUESTIONNAIRE - PHQ9
1. LITTLE INTEREST OR PLEASURE IN DOING THINGS: NOT AT ALL
SUM OF ALL RESPONSES TO PHQ QUESTIONS 1-9: 0
2. FEELING DOWN, DEPRESSED OR HOPELESS: NOT AT ALL
SUM OF ALL RESPONSES TO PHQ QUESTIONS 1-9: 0

## (undated) DEVICE — CATH IV AUTOGRD BC PNK 20GA 25 -- INSYTE

## (undated) DEVICE — Device

## (undated) DEVICE — SYR 3ML LL TIP 1/10ML GRAD --

## (undated) DEVICE — NEEDLE HYPO 18GA L1.5IN PNK S STL HUB POLYPR SHLD REG BVL

## (undated) DEVICE — BASIN EMSIS 16OZ GRAPHITE PLAS KID SHP MOLD GRAD FOR ORAL

## (undated) DEVICE — ELECTRODE,RADIOTRANSLUCENT,FOAM,5PK: Brand: MEDLINE

## (undated) DEVICE — STRAINER URIN CALC RNL MSH -- CONVERT TO ITEM 357634

## (undated) DEVICE — Z DISCONTINUED PER MEDLINE LINE GAS SAMPLING O2/CO2 LNG AD 13 FT NSL W/ TBNG FILTERLINE

## (undated) DEVICE — SYR 10ML LUER LOK 1/5ML GRAD --

## (undated) DEVICE — SNARE ENDOSCP M L240CM W27MM SHTH DIA2.4MM CHN 2.8MM OVL

## (undated) DEVICE — SOLIDIFIER MEDC 1200ML -- CONVERT TO 356117

## (undated) DEVICE — 1200 GUARD II KIT W/5MM TUBE W/O VAC TUBE: Brand: GUARDIAN

## (undated) DEVICE — TRAP SUC MUCOUS 70ML -- MEDICHOICE MEDLINE

## (undated) DEVICE — SET ADMIN 16ML TBNG L100IN 2 Y INJ SITE IV PIGGY BK DISP

## (undated) DEVICE — NEONATAL-ADULT SPO2 SENSOR: Brand: NELLCOR

## (undated) DEVICE — FORCEPS BX L240CM JAW DIA2.8MM L CAP W/ NDL MIC MESH TOOTH

## (undated) DEVICE — NON-REM POLYHESIVE PATIENT RETURN ELECTRODE: Brand: VALLEYLAB

## (undated) DEVICE — CONTAINER SPEC 20 ML LID NEUT BUFF FORMALIN 10 % POLYPR STS

## (undated) DEVICE — BAG SPEC BIOHZRD 10 X 10 IN --

## (undated) DEVICE — TOWEL 4 PLY TISS 19X30 SUE WHT